# Patient Record
Sex: FEMALE | Race: WHITE | NOT HISPANIC OR LATINO
[De-identification: names, ages, dates, MRNs, and addresses within clinical notes are randomized per-mention and may not be internally consistent; named-entity substitution may affect disease eponyms.]

---

## 2019-02-05 ENCOUNTER — RX RENEWAL (OUTPATIENT)
Age: 54
End: 2019-02-05

## 2019-03-08 ENCOUNTER — RX RENEWAL (OUTPATIENT)
Age: 54
End: 2019-03-08

## 2019-03-25 ENCOUNTER — RECORD ABSTRACTING (OUTPATIENT)
Age: 54
End: 2019-03-25

## 2019-03-25 DIAGNOSIS — I67.89 OTHER CEREBROVASCULAR DISEASE: ICD-10-CM

## 2019-03-25 DIAGNOSIS — F32.9 MAJOR DEPRESSIVE DISORDER, SINGLE EPISODE, UNSPECIFIED: ICD-10-CM

## 2019-03-25 DIAGNOSIS — Z83.3 FAMILY HISTORY OF DIABETES MELLITUS: ICD-10-CM

## 2019-03-25 DIAGNOSIS — Z87.09 PERSONAL HISTORY OF OTHER DISEASES OF THE RESPIRATORY SYSTEM: ICD-10-CM

## 2019-03-25 DIAGNOSIS — N95.2 POSTMENOPAUSAL ATROPHIC VAGINITIS: ICD-10-CM

## 2019-03-25 DIAGNOSIS — Z87.891 PERSONAL HISTORY OF NICOTINE DEPENDENCE: ICD-10-CM

## 2019-03-25 DIAGNOSIS — G56.03 CARPAL TUNNEL SYNDROM,BILATERAL UPPER LIMBS: ICD-10-CM

## 2019-03-25 LAB — CYTOLOGY CVX/VAG DOC THIN PREP: NORMAL

## 2019-03-27 ENCOUNTER — APPOINTMENT (OUTPATIENT)
Dept: ENDOCRINOLOGY | Facility: CLINIC | Age: 54
End: 2019-03-27
Payer: COMMERCIAL

## 2019-03-27 VITALS
HEIGHT: 63.25 IN | HEART RATE: 88 BPM | SYSTOLIC BLOOD PRESSURE: 120 MMHG | BODY MASS INDEX: 30.62 KG/M2 | WEIGHT: 175 LBS | DIASTOLIC BLOOD PRESSURE: 80 MMHG

## 2019-03-27 PROCEDURE — 36415 COLL VENOUS BLD VENIPUNCTURE: CPT

## 2019-03-27 PROCEDURE — XXXXX: CPT

## 2019-03-27 PROCEDURE — 99214 OFFICE O/P EST MOD 30 MIN: CPT | Mod: 25

## 2019-03-27 RX ORDER — FLUOXETINE HYDROCHLORIDE 10 MG/1
10 CAPSULE ORAL
Refills: 0 | Status: DISCONTINUED | COMMUNITY
End: 2019-03-27

## 2019-03-27 RX ORDER — LEVOTHYROXINE SODIUM 75 UG/1
75 CAPSULE ORAL
Refills: 0 | Status: DISCONTINUED | COMMUNITY
Start: 1900-01-01 | End: 2019-03-27

## 2019-03-27 RX ORDER — METHYLPHENIDATE HYDROCHLORIDE 36 MG/1
36 TABLET, EXTENDED RELEASE ORAL
Refills: 0 | Status: DISCONTINUED | COMMUNITY
End: 2019-03-27

## 2019-03-27 RX ORDER — GABAPENTIN 300 MG/1
300 CAPSULE ORAL
Refills: 0 | Status: DISCONTINUED | COMMUNITY
End: 2019-03-27

## 2019-03-27 RX ORDER — LEVOTHYROXINE SODIUM 88 UG/1
88 CAPSULE ORAL DAILY
Qty: 90 | Refills: 0 | Status: DISCONTINUED | COMMUNITY
Start: 2019-02-05 | End: 2019-03-27

## 2019-03-27 RX ORDER — VENLAFAXINE HYDROCHLORIDE 75 MG/1
75 CAPSULE, EXTENDED RELEASE ORAL
Refills: 0 | Status: DISCONTINUED | COMMUNITY
End: 2019-03-27

## 2019-03-27 RX ORDER — DICLOFENAC SODIUM 75 MG/1
TABLET, DELAYED RELEASE ORAL
Refills: 0 | Status: DISCONTINUED | COMMUNITY
End: 2019-03-27

## 2019-03-27 RX ORDER — BETAMETHASONE VALERATE 1 MG/ML
0.1 LOTION CUTANEOUS
Refills: 0 | Status: DISCONTINUED | COMMUNITY
End: 2019-03-27

## 2019-03-27 RX ORDER — ESTRADIOL 0.1 MG/G
0.1 CREAM VAGINAL
Refills: 0 | Status: DISCONTINUED | COMMUNITY
End: 2019-03-27

## 2019-03-27 RX ORDER — ALPRAZOLAM 1 MG/1
1 TABLET ORAL
Refills: 0 | Status: DISCONTINUED | COMMUNITY
End: 2019-03-27

## 2019-04-02 LAB
T3 SERPL-MCNC: 68 NG/DL
T4 FREE SERPL-MCNC: 0.8 NG/DL
TSH SERPL-ACNC: 25.6 UIU/ML

## 2019-04-03 ENCOUNTER — OTHER (OUTPATIENT)
Age: 54
End: 2019-04-03

## 2019-04-18 ENCOUNTER — LABORATORY RESULT (OUTPATIENT)
Age: 54
End: 2019-04-18

## 2019-04-19 ENCOUNTER — NON-APPOINTMENT (OUTPATIENT)
Age: 54
End: 2019-04-19

## 2019-04-19 ENCOUNTER — APPOINTMENT (OUTPATIENT)
Dept: INTERNAL MEDICINE | Facility: CLINIC | Age: 54
End: 2019-04-19
Payer: MEDICAID

## 2019-04-19 VITALS
SYSTOLIC BLOOD PRESSURE: 116 MMHG | BODY MASS INDEX: 30.97 KG/M2 | HEIGHT: 63.25 IN | WEIGHT: 177 LBS | DIASTOLIC BLOOD PRESSURE: 70 MMHG

## 2019-04-19 PROCEDURE — 93000 ELECTROCARDIOGRAM COMPLETE: CPT

## 2019-04-19 PROCEDURE — 99396 PREV VISIT EST AGE 40-64: CPT | Mod: 25

## 2019-04-19 PROCEDURE — 36415 COLL VENOUS BLD VENIPUNCTURE: CPT

## 2019-04-19 RX ORDER — LEVOTHYROXINE SODIUM 0.09 MG/1
88 TABLET ORAL DAILY
Qty: 30 | Refills: 1 | Status: DISCONTINUED | COMMUNITY
Start: 2019-03-08 | End: 2019-04-19

## 2019-04-19 NOTE — COUNSELING
[Weight management counseling provided] : Weight management [Activity counseling provided] : activity [Healthy eating counseling provided] : healthy eating [Good understanding] : Patient has a good understanding of disease, goals and obesity follow-up plan

## 2019-04-19 NOTE — PLAN
[FreeTextEntry1] : 53-year-old female presents for annual exam feels well up-to-date with screenings\par \par Reviewed diet encouraged to get some exercise.\par \par Call one week for lab results

## 2019-04-19 NOTE — HEALTH RISK ASSESSMENT
[Good] : ~his/her~  mood as  good [No falls in past year] : Patient reported no falls in the past year [0] : 2) Feeling down, depressed, or hopeless: Not at all (0) [Patient reported mammogram was normal] : Patient reported mammogram was normal [Patient reported PAP Smear was normal] : Patient reported PAP Smear was normal [Patient reported colonoscopy was normal] : Patient reported colonoscopy was normal [HIV test declined] : HIV test declined [Hepatitis C test declined] : Hepatitis C test declined [None] : None [With Family] : lives with family [] :  [Feels Safe at Home] : Feels safe at home [] : No [MammogramDate] : 08/2017 [MammogramComments] : DR. HAKEEM PASCUAL [PapSmearDate] : 04/2018 [BoneDensityDate] : N/A [PapSmearComments] : DR. HAKEEM PASCUAL [ColonoscopyComments] : DR. CARNES @ [facility] REPEAT IN 5 YEARS  [ColonoscopyDate] : 10/2016

## 2019-04-19 NOTE — HISTORY OF PRESENT ILLNESS
[de-identified] : 53-year-old female presents for annual exam, feels well. Denies chest pain shortness of breath palpitations\par \par Up-to-date with screenings [FreeTextEntry1] : annual exam

## 2019-04-19 NOTE — PHYSICAL EXAM
[Obese] : patient was observed to be obese [Normal Female:] : bladder was normal on palpation [Normal] : no joint swelling, grossly normal strength/tone [de-identified] : Denies pain and lumps discharge [FreeTextEntry1] : Deferred [de-identified] : No lymphadenopathy [de-identified] : Denies excessive thirst, urination, fatigue [de-identified] : No rash, skin lesions [de-identified] : Alert and oriented x3, no thought disorder

## 2019-04-20 LAB
ALBUMIN SERPL ELPH-MCNC: 4.4 G/DL
ALP BLD-CCNC: 76 U/L
ALT SERPL-CCNC: 23 U/L
ANION GAP SERPL CALC-SCNC: 14 MMOL/L
APPEARANCE: ABNORMAL
AST SERPL-CCNC: 33 U/L
BASOPHILS # BLD AUTO: 0.07 K/UL
BASOPHILS NFR BLD AUTO: 0.8 %
BILIRUB SERPL-MCNC: 0.4 MG/DL
BILIRUBIN URINE: NEGATIVE
BLOOD URINE: NEGATIVE
BUN SERPL-MCNC: 20 MG/DL
CALCIUM SERPL-MCNC: 9.6 MG/DL
CHLORIDE SERPL-SCNC: 101 MMOL/L
CHOLEST SERPL-MCNC: 229 MG/DL
CHOLEST/HDLC SERPL: 3.5 RATIO
CO2 SERPL-SCNC: 24 MMOL/L
COLOR: YELLOW
CREAT SERPL-MCNC: 0.71 MG/DL
EOSINOPHIL # BLD AUTO: 0.3 K/UL
EOSINOPHIL NFR BLD AUTO: 3.4 %
FOLATE SERPL-MCNC: 11.1 NG/ML
GLUCOSE QUALITATIVE U: NEGATIVE
GLUCOSE SERPL-MCNC: 102 MG/DL
HCT VFR BLD CALC: 40.6 %
HDLC SERPL-MCNC: 65 MG/DL
HGB BLD-MCNC: 12.9 G/DL
IMM GRANULOCYTES NFR BLD AUTO: 0.3 %
KETONES URINE: NEGATIVE
LDLC SERPL CALC-MCNC: 137 MG/DL
LEUKOCYTE ESTERASE URINE: ABNORMAL
LYMPHOCYTES # BLD AUTO: 2.18 K/UL
LYMPHOCYTES NFR BLD AUTO: 24.6 %
MAN DIFF?: NORMAL
MCHC RBC-ENTMCNC: 30.1 PG
MCHC RBC-ENTMCNC: 31.8 GM/DL
MCV RBC AUTO: 94.9 FL
MONOCYTES # BLD AUTO: 0.81 K/UL
MONOCYTES NFR BLD AUTO: 9.1 %
NEUTROPHILS # BLD AUTO: 5.47 K/UL
NEUTROPHILS NFR BLD AUTO: 61.8 %
NITRITE URINE: NEGATIVE
PH URINE: 5.5
PLATELET # BLD AUTO: 293 K/UL
POTASSIUM SERPL-SCNC: 4.3 MMOL/L
PROT SERPL-MCNC: 7.5 G/DL
PROTEIN URINE: NORMAL
RBC # BLD: 4.28 M/UL
RBC # FLD: 14.9 %
SODIUM SERPL-SCNC: 139 MMOL/L
SPECIFIC GRAVITY URINE: 1.02
T4 FREE SERPL-MCNC: 1.3 NG/DL
TRIGL SERPL-MCNC: 137 MG/DL
TSH SERPL-ACNC: 9.9 UIU/ML
UROBILINOGEN URINE: NORMAL
VIT B12 SERPL-MCNC: 472 PG/ML
WBC # FLD AUTO: 8.86 K/UL

## 2019-04-21 LAB — 25(OH)D3 SERPL-MCNC: 22.8 NG/ML

## 2019-06-03 ENCOUNTER — RESULT REVIEW (OUTPATIENT)
Age: 54
End: 2019-06-03

## 2019-07-20 NOTE — HISTORY OF PRESENT ILLNESS
[FreeTextEntry1] : Ms. JAG TALBERT is a 53 year old female\par coming for f/u hypothyroidism\par        used to be on Levothyroxine 75mcg same dose for about a month dose increased from 88mcg RYAN Synthroid by her PCP by her PCP Dr Preet Gutierrez [place]\par        11/7/2016 was swiched from Synthroid to [place] alternates from 30mg qod to 45mg qod\par        feels better, denies palpitations, forgets to take the 15mg on and off about twice a week per pt \par        negative cardiac workup per pt\par        liked it but she can not afford two copays, asking to try 60mg\par        will repeat labs first and decide depending on her results\par        seen Dr Potts before who diagnosed her with hypothyroidism, felt really tired, pains all over\par        last labwork 8/12/16 by Dr Chen her asthma doctor has an echocardiogram scheduled , sees Cardiology Dr Prasad on [address]\par        AST mildly elevated 46\par        TSH 0.33\par        thyroid US 9/2015 showed 7mm isthmus nodule\par        denies FHx thyroid Ca\par        denies neck iraadiation\par        has problems swallowing solid food, on and off\par        has dysphonia on and off\par        has asthma, has dyspnea\par        repeated thyroid US 9/2016 stable 7mm isthmus nodule.

## 2019-07-20 NOTE — REVIEW OF SYSTEMS
[Fatigue] : fatigue [SOB on Exertion] : shortness of breath during exertion [Dry Skin] : dry skin [Depression] : depression [Anxiety] : anxiety [Insomnia] : insomnia [FreeTextEntry8] : postmenopausal since 50yo [de-identified] : rash dryness , itching, will see [facility]

## 2019-08-14 ENCOUNTER — LABORATORY RESULT (OUTPATIENT)
Age: 54
End: 2019-08-14

## 2019-08-14 ENCOUNTER — APPOINTMENT (OUTPATIENT)
Dept: ENDOCRINOLOGY | Facility: CLINIC | Age: 54
End: 2019-08-14

## 2019-08-27 ENCOUNTER — APPOINTMENT (OUTPATIENT)
Dept: OBGYN | Facility: CLINIC | Age: 54
End: 2019-08-27
Payer: MEDICAID

## 2019-08-27 VITALS
BODY MASS INDEX: 31.01 KG/M2 | HEIGHT: 63 IN | SYSTOLIC BLOOD PRESSURE: 112 MMHG | WEIGHT: 175 LBS | DIASTOLIC BLOOD PRESSURE: 70 MMHG

## 2019-08-27 PROCEDURE — 99396 PREV VISIT EST AGE 40-64: CPT

## 2019-10-02 ENCOUNTER — APPOINTMENT (OUTPATIENT)
Dept: INTERNAL MEDICINE | Facility: CLINIC | Age: 54
End: 2019-10-02
Payer: MEDICAID

## 2019-10-02 VITALS
BODY MASS INDEX: 31.01 KG/M2 | WEIGHT: 175 LBS | HEIGHT: 63 IN | DIASTOLIC BLOOD PRESSURE: 80 MMHG | SYSTOLIC BLOOD PRESSURE: 122 MMHG

## 2019-10-02 DIAGNOSIS — M54.9 DORSALGIA, UNSPECIFIED: ICD-10-CM

## 2019-10-02 PROCEDURE — 90686 IIV4 VACC NO PRSV 0.5 ML IM: CPT

## 2019-10-02 PROCEDURE — 99213 OFFICE O/P EST LOW 20 MIN: CPT | Mod: 25

## 2019-10-02 PROCEDURE — G0008: CPT

## 2019-10-02 NOTE — PHYSICAL EXAM
[No Acute Distress] : no acute distress [Well Nourished] : well nourished [Well Developed] : well developed [Well-Appearing] : well-appearing [Normal Sclera/Conjunctiva] : normal sclera/conjunctiva [PERRL] : pupils equal round and reactive to light [Normal Outer Ear/Nose] : the outer ears and nose were normal in appearance [EOMI] : extraocular movements intact [Normal Oropharynx] : the oropharynx was normal [No Lymphadenopathy] : no lymphadenopathy [No JVD] : no jugular venous distention [Supple] : supple [Thyroid Normal, No Nodules] : the thyroid was normal and there were no nodules present [No Respiratory Distress] : no respiratory distress  [No Accessory Muscle Use] : no accessory muscle use [Normal Rate] : normal rate  [Clear to Auscultation] : lungs were clear to auscultation bilaterally [Regular Rhythm] : with a regular rhythm [Normal S1, S2] : normal S1 and S2 [No Carotid Bruits] : no carotid bruits [No Murmur] : no murmur heard [No Abdominal Bruit] : a ~M bruit was not heard ~T in the abdomen [No Varicosities] : no varicosities [Pedal Pulses Present] : the pedal pulses are present [No Edema] : there was no peripheral edema [No Palpable Aorta] : no palpable aorta [No Extremity Clubbing/Cyanosis] : no extremity clubbing/cyanosis [Soft] : abdomen soft [Non Tender] : non-tender [Non-distended] : non-distended [No Masses] : no abdominal mass palpated [No HSM] : no HSM [Normal Bowel Sounds] : normal bowel sounds [Normal Posterior Cervical Nodes] : no posterior cervical lymphadenopathy [Normal Anterior Cervical Nodes] : no anterior cervical lymphadenopathy [No CVA Tenderness] : no CVA  tenderness [No Spinal Tenderness] : no spinal tenderness [No Joint Swelling] : no joint swelling [Grossly Normal Strength/Tone] : grossly normal strength/tone [Coordination Grossly Intact] : coordination grossly intact [No Rash] : no rash [No Focal Deficits] : no focal deficits [Normal Gait] : normal gait [Deep Tendon Reflexes (DTR)] : deep tendon reflexes were 2+ and symmetric [Normal Affect] : the affect was normal [Normal Insight/Judgement] : insight and judgment were intact

## 2019-10-02 NOTE — HISTORY OF PRESENT ILLNESS
[FreeTextEntry8] : 53-year-old female presents with complaint of lower back pain, even though she doesn't have any urinary frequency urgency or dysuria she is afraid she may have a urinary tract infection. She has an appointment with an orthopedist at the end of this month to address her back pain. Denies weakness, fever chills

## 2019-10-02 NOTE — PLAN
[FreeTextEntry1] : 53-year-old female complaining of back pain concerned she may have urinary tract infection. Denies urinary frequency urgency dysuria. She has an appointment with orthopedist at the end of this month for her back pain. Reassured patient it was likely not a urinary tract infection, keep appointment with orthopedist but urinalysis and urine culture will be sent today\par \par Call 2 days to review results

## 2019-10-03 LAB
APPEARANCE: CLEAR
BILIRUBIN URINE: NEGATIVE
BLOOD URINE: NEGATIVE
COLOR: NORMAL
GLUCOSE QUALITATIVE U: NEGATIVE
KETONES URINE: NEGATIVE
LEUKOCYTE ESTERASE URINE: NEGATIVE
NITRITE URINE: NEGATIVE
PH URINE: 5
PROTEIN URINE: NEGATIVE
SPECIFIC GRAVITY URINE: 1.01
UROBILINOGEN URINE: NORMAL

## 2019-10-04 LAB — BACTERIA UR CULT: NORMAL

## 2019-10-14 ENCOUNTER — RESULT REVIEW (OUTPATIENT)
Age: 54
End: 2019-10-14

## 2019-10-23 ENCOUNTER — RECORD ABSTRACTING (OUTPATIENT)
Age: 54
End: 2019-10-23

## 2019-10-23 ENCOUNTER — APPOINTMENT (OUTPATIENT)
Dept: ENDOCRINOLOGY | Facility: CLINIC | Age: 54
End: 2019-10-23

## 2019-10-24 LAB
CYTOLOGY CVX/VAG DOC THIN PREP: NORMAL
HPV HIGH+LOW RISK DNA PNL CVX: NOT DETECTED

## 2019-11-29 LAB
T4 FREE SERPL-MCNC: 1.4 NG/DL
TSH SERPL-ACNC: 1.8 UIU/ML

## 2020-01-27 ENCOUNTER — APPOINTMENT (OUTPATIENT)
Dept: INTERNAL MEDICINE | Facility: CLINIC | Age: 55
End: 2020-01-27
Payer: MEDICAID

## 2020-01-27 VITALS
SYSTOLIC BLOOD PRESSURE: 100 MMHG | HEIGHT: 63 IN | WEIGHT: 175 LBS | BODY MASS INDEX: 31.01 KG/M2 | DIASTOLIC BLOOD PRESSURE: 60 MMHG

## 2020-01-27 VITALS — DIASTOLIC BLOOD PRESSURE: 64 MMHG | SYSTOLIC BLOOD PRESSURE: 108 MMHG

## 2020-01-27 PROCEDURE — 99213 OFFICE O/P EST LOW 20 MIN: CPT

## 2020-01-27 RX ORDER — TRAZODONE HYDROCHLORIDE 50 MG/1
50 TABLET ORAL DAILY
Refills: 0 | Status: DISCONTINUED | COMMUNITY
Start: 2019-03-18 | End: 2020-01-27

## 2020-01-27 RX ORDER — QUETIAPINE FUMARATE 50 MG/1
50 TABLET ORAL
Refills: 0 | Status: DISCONTINUED | COMMUNITY
End: 2020-01-27

## 2020-01-27 NOTE — REVIEW OF SYSTEMS
[Earache] : no earache [Hearing Loss] : no hearing loss [Negative] : Heme/Lymph [FreeTextEntry4] : itchy ears

## 2020-01-27 NOTE — PLAN
[FreeTextEntry1] : 54-year-old female presents for blood pressure check following reading of 100/60 in neurologists office. Blood pressure is about the same here, patient is asymptomatic. Reviewed diet and fluid intake. Advised patient if she develops any symptoms of low blood pressure i.e. dizziness lightheadedness to return to office\par \par Slight eczema both the ears given Elocon, advised to use h.s. for a few nights in a row\par \par If any symptoms increase or persist return to office

## 2020-01-27 NOTE — HISTORY OF PRESENT ILLNESS
[Spouse] : spouse [FreeTextEntry1] : Complaining of low blood pressure and itchy ears [de-identified] : 54 year-old female presents following appointment with neurologist where her blood pressure was on the low side, was advised to follow up with PCP. Patient feels well, denies chest pain shortness of breath palpitations dizziness. Also complaining about very itchy years, denies earache

## 2020-10-01 PROBLEM — I67.89 CEREBRAL MICROVASCULAR DISEASE: Status: ACTIVE | Noted: 2019-03-25

## 2020-10-02 ENCOUNTER — APPOINTMENT (OUTPATIENT)
Dept: INTERNAL MEDICINE | Facility: CLINIC | Age: 55
End: 2020-10-02
Payer: MEDICAID

## 2020-10-02 ENCOUNTER — NON-APPOINTMENT (OUTPATIENT)
Age: 55
End: 2020-10-02

## 2020-10-02 VITALS
SYSTOLIC BLOOD PRESSURE: 122 MMHG | TEMPERATURE: 97.1 F | HEIGHT: 63 IN | BODY MASS INDEX: 30.65 KG/M2 | DIASTOLIC BLOOD PRESSURE: 70 MMHG | WEIGHT: 173 LBS

## 2020-10-02 PROCEDURE — 90686 IIV4 VACC NO PRSV 0.5 ML IM: CPT

## 2020-10-02 PROCEDURE — 36415 COLL VENOUS BLD VENIPUNCTURE: CPT

## 2020-10-02 PROCEDURE — 99396 PREV VISIT EST AGE 40-64: CPT | Mod: 25

## 2020-10-02 PROCEDURE — 93000 ELECTROCARDIOGRAM COMPLETE: CPT

## 2020-10-02 PROCEDURE — G0008: CPT

## 2020-10-02 NOTE — HISTORY OF PRESENT ILLNESS
[FreeTextEntry1] : Annual exam [de-identified] : 54-year-old female former smoker presents for annual exam complaining of persistent cough. Patient has history of greater than 30-pack-year smoker, quit approximately 10-15 years ago. Unsure if she smoked at all in the past 10-15 years. Denies chest pain shortness of breath palpitations dizziness. Patient also has clear nasal discharge which is persistent, she has several allergies and she has cats at home.\par \par Up to date with screenings

## 2020-10-02 NOTE — REVIEW OF SYSTEMS
[Nasal Discharge] : nasal discharge [Postnasal Drip] : postnasal drip [Cough] : cough [Negative] : Heme/Lymph [FreeTextEntry6] : persistent

## 2020-10-02 NOTE — HEALTH RISK ASSESSMENT
[Very Good] : ~his/her~  mood as very good [No] : No [Never (0 pts)] : Never (0 points) [No falls in past year] : Patient reported no falls in the past year [0] : 2) Feeling down, depressed, or hopeless: Not at all (0) [] : No [[id number]] : 0

## 2020-10-02 NOTE — PHYSICAL EXAM
[Normal Female:] : bladder was normal on palpation [Normal] : normal gait, coordination grossly intact, no focal deficits [de-identified] : Deferred GYN; Denies pain, lumps and discharge [FreeTextEntry1] : Deferred GI/GYN [de-identified] : No lymphadenopathy [de-identified] : Denies excessive thirst, urination, fatigue [de-identified] : No rash or skin lesion [de-identified] : Alert and Oriented x3.  Appropriate mood and affect

## 2020-10-02 NOTE — PLAN
[FreeTextEntry1] : 54-year-old female former smoker presents for annual exam complaining of persistent cough and clear nasal discharge. Most likely related to allergies but due to history of smoking we'll get a chest CT. Advised to start Allegra h.s. and use her Flonase HS consistently. Also advised to keep her cats out of her bedroom.\par Flu vaccine given today\par Reviewed diet and stressed importance of regular exercise.\par \par Up to date with colonoscopy, due for mammogram which she will make an appointment for\par \par Call next week to review labs

## 2020-10-03 LAB
ALBUMIN SERPL ELPH-MCNC: 4.3 G/DL
ALP BLD-CCNC: 79 U/L
ALT SERPL-CCNC: 14 U/L
ANION GAP SERPL CALC-SCNC: 13 MMOL/L
APPEARANCE: ABNORMAL
AST SERPL-CCNC: 23 U/L
BASOPHILS # BLD AUTO: 0.09 K/UL
BASOPHILS NFR BLD AUTO: 1.1 %
BILIRUB SERPL-MCNC: 0.3 MG/DL
BILIRUBIN URINE: NEGATIVE
BLOOD URINE: NEGATIVE
BUN SERPL-MCNC: 11 MG/DL
CALCIUM SERPL-MCNC: 9.4 MG/DL
CHLORIDE SERPL-SCNC: 97 MMOL/L
CHOLEST SERPL-MCNC: 227 MG/DL
CHOLEST/HDLC SERPL: 4.7 RATIO
CO2 SERPL-SCNC: 25 MMOL/L
COLOR: NORMAL
CREAT SERPL-MCNC: 0.67 MG/DL
EOSINOPHIL # BLD AUTO: 0.42 K/UL
EOSINOPHIL NFR BLD AUTO: 4.9 %
FOLATE SERPL-MCNC: 5.1 NG/ML
GLUCOSE QUALITATIVE U: NEGATIVE
GLUCOSE SERPL-MCNC: 98 MG/DL
HCT VFR BLD CALC: 40 %
HDLC SERPL-MCNC: 49 MG/DL
HGB BLD-MCNC: 12.3 G/DL
IMM GRANULOCYTES NFR BLD AUTO: 0.5 %
KETONES URINE: NEGATIVE
LDLC SERPL CALC-MCNC: 149 MG/DL
LEUKOCYTE ESTERASE URINE: ABNORMAL
LYMPHOCYTES # BLD AUTO: 2.45 K/UL
LYMPHOCYTES NFR BLD AUTO: 28.6 %
MAN DIFF?: NORMAL
MCHC RBC-ENTMCNC: 29 PG
MCHC RBC-ENTMCNC: 30.8 GM/DL
MCV RBC AUTO: 94.3 FL
MONOCYTES # BLD AUTO: 0.71 K/UL
MONOCYTES NFR BLD AUTO: 8.3 %
NEUTROPHILS # BLD AUTO: 4.86 K/UL
NEUTROPHILS NFR BLD AUTO: 56.6 %
NITRITE URINE: NEGATIVE
PH URINE: 5.5
PLATELET # BLD AUTO: 314 K/UL
POTASSIUM SERPL-SCNC: 4.5 MMOL/L
PROT SERPL-MCNC: 7 G/DL
PROTEIN URINE: NEGATIVE
RBC # BLD: 4.24 M/UL
RBC # FLD: 14.3 %
SARS-COV-2 IGG SERPL IA-ACNC: 0.09 INDEX
SARS-COV-2 IGG SERPL QL IA: NEGATIVE
SODIUM SERPL-SCNC: 136 MMOL/L
SPECIFIC GRAVITY URINE: 1
T4 FREE SERPL-MCNC: 1.3 NG/DL
TRIGL SERPL-MCNC: 149 MG/DL
TSH SERPL-ACNC: 4.31 UIU/ML
UROBILINOGEN URINE: NORMAL
VIT B12 SERPL-MCNC: 468 PG/ML
WBC # FLD AUTO: 8.57 K/UL

## 2020-10-06 ENCOUNTER — RX RENEWAL (OUTPATIENT)
Age: 55
End: 2020-10-06

## 2020-10-09 ENCOUNTER — TRANSCRIPTION ENCOUNTER (OUTPATIENT)
Age: 55
End: 2020-10-09

## 2020-11-18 ENCOUNTER — RESULT REVIEW (OUTPATIENT)
Age: 55
End: 2020-11-18

## 2020-11-20 ENCOUNTER — RESULT REVIEW (OUTPATIENT)
Age: 55
End: 2020-11-20

## 2020-11-20 ENCOUNTER — APPOINTMENT (OUTPATIENT)
Dept: INTERNAL MEDICINE | Facility: CLINIC | Age: 55
End: 2020-11-20
Payer: MEDICAID

## 2020-11-20 VITALS
SYSTOLIC BLOOD PRESSURE: 110 MMHG | BODY MASS INDEX: 31.01 KG/M2 | WEIGHT: 175 LBS | HEIGHT: 63 IN | TEMPERATURE: 98.9 F | DIASTOLIC BLOOD PRESSURE: 60 MMHG

## 2020-11-20 PROCEDURE — 99213 OFFICE O/P EST LOW 20 MIN: CPT

## 2020-11-20 NOTE — PLAN
[FreeTextEntry1] : 55-year-old female with persistent cough for a few months, mostly nonproductive. Denies chest pain shortness of breath palpitations dizziness. Denies wheezing. Former smoker. As noted chest x-ray will be done today, we will continue to pursue low dose chest CT but also refer to Dr. Lang

## 2020-11-20 NOTE — HISTORY OF PRESENT ILLNESS
[FreeTextEntry8] : 55-year-old female presents complaining of persistent mostly dry cough. She is a former smoker, we've tried for a couple of months to get approval for low dose chest CT which we've been unable to obtain from her insurance company which has resolved frustrated. Her lungs are clear to auscultation, she denies chest pain shortness of breath. Today a chest x-ray will be done and if normal will refer to Dr. Rolan Lang for persistent cough

## 2020-12-16 ENCOUNTER — APPOINTMENT (OUTPATIENT)
Dept: INTERNAL MEDICINE | Facility: CLINIC | Age: 55
End: 2020-12-16
Payer: MEDICAID

## 2020-12-16 PROCEDURE — 36415 COLL VENOUS BLD VENIPUNCTURE: CPT

## 2020-12-16 PROCEDURE — 99072 ADDL SUPL MATRL&STAF TM PHE: CPT

## 2020-12-17 LAB
CHOLEST SERPL-MCNC: 141 MG/DL
HDLC SERPL-MCNC: 52 MG/DL
LDLC SERPL CALC-MCNC: 71 MG/DL
NONHDLC SERPL-MCNC: 89 MG/DL
TRIGL SERPL-MCNC: 90 MG/DL

## 2020-12-18 ENCOUNTER — APPOINTMENT (OUTPATIENT)
Dept: ENDOCRINOLOGY | Facility: CLINIC | Age: 55
End: 2020-12-18
Payer: MEDICAID

## 2020-12-18 VITALS
OXYGEN SATURATION: 98 % | SYSTOLIC BLOOD PRESSURE: 108 MMHG | HEIGHT: 63 IN | DIASTOLIC BLOOD PRESSURE: 64 MMHG | BODY MASS INDEX: 30.12 KG/M2 | TEMPERATURE: 96.9 F | HEART RATE: 84 BPM | WEIGHT: 170 LBS

## 2020-12-18 PROCEDURE — 36415 COLL VENOUS BLD VENIPUNCTURE: CPT

## 2020-12-18 PROCEDURE — 99072 ADDL SUPL MATRL&STAF TM PHE: CPT

## 2020-12-18 PROCEDURE — 99214 OFFICE O/P EST MOD 30 MIN: CPT | Mod: 25

## 2020-12-18 NOTE — REVIEW OF SYSTEMS
[Nasal Congestion] : nasal congestion [Joint Pain] : joint pain [Negative] : Heme/Lymph [FreeTextEntry4] : sinus problems sees ENT  [FreeTextEntry8] : pain in her breast has mammogram scheduled next week, also has appointment with GYN advised to get one rather sooner than later as has FHx breast cancer [FreeTextEntry9] : left shoulder pain chronically per pt  [de-identified] : itchy scalp chronically seen derm did not help per pt

## 2020-12-18 NOTE — HISTORY OF PRESENT ILLNESS
[FreeTextEntry1] : Ms. JAG TALBERT is a 55 year old female\par coming for f/u hypothyroidism\par on Tirosint 75mcg qd \par        used to be on Levothyroxine 75mcg same dose for about a month dose increased from 88mcg RYAN Synthroid by her PCP by her PCP Dr Preet Gutierrez [place]\par        11/7/2016 was swiched from Synthroid to [place] alternates from 30mg qod to 45mg qod\par        feels better, denies palpitations, forgets to take the 15mg on and off about twice a week per pt \par        negative cardiac workup per pt\par        liked it but she can not afford two copays, asking to try 60mg\par        will repeat labs first and decide depending on her results\par        seen Dr Potts before who diagnosed her with hypothyroidism, felt really tired, pains all over\par        last labwork 8/12/16 by Dr Chen her asthma doctor has an echocardiogram scheduled , sees Cardiology Dr Prasad on [address]\par        AST mildly elevated 46\par        TSH 0.33\par        thyroid US 9/2015 showed 7mm isthmus nodule\par        denies FHx thyroid Ca\par        denies neck iraadiation\par        has problems swallowing solid food, on and off\par        has dysphonia on and off\par        has asthma, has dyspnea\par        repeated thyroid US 9/2016 stable 7mm isthmus nodule.

## 2020-12-20 LAB
T4 FREE SERPL-MCNC: 1.5 NG/DL
TSH SERPL-ACNC: 1.25 UIU/ML

## 2020-12-23 ENCOUNTER — RESULT REVIEW (OUTPATIENT)
Age: 55
End: 2020-12-23

## 2021-01-04 ENCOUNTER — APPOINTMENT (OUTPATIENT)
Dept: THORACIC SURGERY | Facility: CLINIC | Age: 56
End: 2021-01-04
Payer: MEDICAID

## 2021-01-04 VITALS — HEIGHT: 64 IN | WEIGHT: 170 LBS | BODY MASS INDEX: 29.02 KG/M2

## 2021-01-04 DIAGNOSIS — Z80.1 FAMILY HISTORY OF MALIGNANT NEOPLASM OF TRACHEA, BRONCHUS AND LUNG: ICD-10-CM

## 2021-01-04 DIAGNOSIS — Z87.891 PERSONAL HISTORY OF NICOTINE DEPENDENCE: ICD-10-CM

## 2021-01-04 PROCEDURE — G0296 VISIT TO DETERM LDCT ELIG: CPT

## 2021-01-04 PROCEDURE — 99072 ADDL SUPL MATRL&STAF TM PHE: CPT

## 2021-01-04 NOTE — REASON FOR VISIT
[Home] : at home, [unfilled] , at the time of the visit. [Medical Office: ([facility])___] : at the medical office located in  [Verbal consent obtained from patient] : the patient, [unfilled] [Initial Evaluation] : an initial evaluation visit [Review of Eligibility] : review of eligibility [Low-Dose CT Screening Discussion] : low-dose CT lung cancer screening discussion

## 2021-01-04 NOTE — PLAN
[FreeTextEntry1] : Plan:\par \par -Low dose CT chest for lung cancer screening. Lori Vasquez FNP has been CC'd in order for LDCT.\par \par -Follow up with patient and her referring provider after her LDCT results have been reviewed by the multidisciplinary clinical team.\par \par -Encourage continued smoking abstinence.\par \par Engaged in discussion regarding risks of screening during Covid-19 pandemic and precautions that are being used  to reduce exposure.\par \par Engaged in shared decision making with Ms. TALBERT . Answered all questions. She verbalized understanding and agreement. She knows to call back with and questions or concerns.\par

## 2021-01-04 NOTE — HISTORY OF PRESENT ILLNESS
[Former] : former smoker [_____ pack-years] : [unfilled] pack-years [TextBox_13] : Referred by Lori Vasquez\par \par JAG TALBERT had telephonic visit for a review of eligibility and discussion of the Low dose CT lung cancer screening program. A telephonic visit occurred due to the patient not having access to a smart phone or a computer for an audio/visual visit.  The following was reviewed and confirmed the patient meets screening eligibility criteria.\par -Age 55 year\par Smoking Status:\par -Former smoker\par -Number of pack(s) per day: 2ppd\par -Number of years smoked: 30\par -Number of pack years smokin\par -Number of years since quitting smoking: 15\par \par Ms. TALBERT reports having exertional dyspnea and chronic cough for many years. Become short of breath walking up stairs. She reports she was once told she had asthma. But is not currently being treated . She denies any signs or symptoms of lung cancer including new cough, changing cough, hemoptysis, and unintentional weight loss. \par \par Ms. TALBERT  denies any personal history of lung cancer. Reports no lung cancer in a 1st degree relative. Denies and history of lung disease. Denies any history of  occupational exposures.\par

## 2021-01-13 ENCOUNTER — RESULT REVIEW (OUTPATIENT)
Age: 56
End: 2021-01-13

## 2021-01-15 ENCOUNTER — NON-APPOINTMENT (OUTPATIENT)
Age: 56
End: 2021-01-15

## 2021-01-19 ENCOUNTER — RX CHANGE (OUTPATIENT)
Age: 56
End: 2021-01-19

## 2021-03-01 ENCOUNTER — APPOINTMENT (OUTPATIENT)
Dept: PULMONOLOGY | Facility: CLINIC | Age: 56
End: 2021-03-01
Payer: MEDICAID

## 2021-03-01 VITALS
HEART RATE: 82 BPM | DIASTOLIC BLOOD PRESSURE: 60 MMHG | HEIGHT: 64 IN | OXYGEN SATURATION: 96 % | SYSTOLIC BLOOD PRESSURE: 98 MMHG | WEIGHT: 171 LBS | TEMPERATURE: 96.9 F | BODY MASS INDEX: 29.19 KG/M2

## 2021-03-01 PROCEDURE — 99204 OFFICE O/P NEW MOD 45 MIN: CPT

## 2021-03-01 PROCEDURE — 99072 ADDL SUPL MATRL&STAF TM PHE: CPT

## 2021-03-01 NOTE — REVIEW OF SYSTEMS
[Postnasal Drip] : postnasal drip [Cough] : cough [Dyspnea] : dyspnea [Thyroid Problem] : thyroid problem [Negative] : Psychiatric

## 2021-03-01 NOTE — HISTORY OF PRESENT ILLNESS
[TextBox_4] : 55 year old female with hypothyroidism, ex smoker was referred for an abnormal LDCT done for lung cancer screening\par CT chest seen, my interpretation: several stellate, nodular lesions b/l. Upper lobes fibrosis. No adenopathy or effusions.\par Has dyspnea on exertion for many years.\par She coughs every day especially when inhaling perfumes, dust.\par No sputum production..\par She coughs mostly in the daytime.\par No hemoptysis.\par No weight loss.\par SHX: quit 15 years ago- 60 pack year\par FHX: grandmother with lung cancer

## 2021-03-01 NOTE — PHYSICAL EXAM
[No Acute Distress] : no acute distress [Normal Oropharynx] : normal oropharynx [II] : Mallampati Class: II [Normal Appearance] : normal appearance [No Neck Mass] : no neck mass [Normal Rate/Rhythm] : normal rate/rhythm [Normal S1, S2] : normal s1, s2 [No Murmurs] : no murmurs [No Resp Distress] : no resp distress [No Acc Muscle Use] : no acc muscle use [Clear to Auscultation Bilaterally] : clear to auscultation bilaterally [Benign] : benign [Normal Gait] : normal gait [No Clubbing] : no clubbing [No Cyanosis] : no cyanosis [No Edema] : no edema [Normal Turgor] : normal turgor [No Focal Deficits] : no focal deficits [Oriented x3] : oriented x3 [Normal Affect] : normal affect

## 2021-03-10 ENCOUNTER — APPOINTMENT (OUTPATIENT)
Dept: OBGYN | Facility: CLINIC | Age: 56
End: 2021-03-10
Payer: MEDICAID

## 2021-03-10 VITALS
WEIGHT: 174 LBS | BODY MASS INDEX: 29.71 KG/M2 | HEIGHT: 64 IN | SYSTOLIC BLOOD PRESSURE: 90 MMHG | DIASTOLIC BLOOD PRESSURE: 60 MMHG

## 2021-03-10 PROCEDURE — 99396 PREV VISIT EST AGE 40-64: CPT

## 2021-03-10 PROCEDURE — 99072 ADDL SUPL MATRL&STAF TM PHE: CPT

## 2021-03-11 NOTE — HISTORY OF PRESENT ILLNESS
[TextBox_4] : 56yo P1 here for annual gyn exam. Used to go to [facility]. Follows with Dr. Hernandez.  since age 49yo. No bleeding.  Used to have hot flashes, but they have stopped. Not sexually active x years b/c of 's health..\par         x 20yrs. Not active x 8yrs b/c of pain during sex.  Not working currently; used to be a  at [employer].  works. Has one daughter 19yo/ in [place]; she is attending [employer] studying biology.

## 2021-03-12 ENCOUNTER — APPOINTMENT (OUTPATIENT)
Dept: GASTROENTEROLOGY | Facility: CLINIC | Age: 56
End: 2021-03-12

## 2021-03-23 ENCOUNTER — RESULT REVIEW (OUTPATIENT)
Age: 56
End: 2021-03-23

## 2021-04-12 ENCOUNTER — RX RENEWAL (OUTPATIENT)
Age: 56
End: 2021-04-12

## 2021-04-22 ENCOUNTER — APPOINTMENT (OUTPATIENT)
Dept: PULMONOLOGY | Facility: CLINIC | Age: 56
End: 2021-04-22
Payer: MEDICAID

## 2021-04-22 VITALS
OXYGEN SATURATION: 96 % | HEART RATE: 80 BPM | DIASTOLIC BLOOD PRESSURE: 70 MMHG | HEIGHT: 64 IN | WEIGHT: 174 LBS | BODY MASS INDEX: 29.71 KG/M2 | TEMPERATURE: 97 F | SYSTOLIC BLOOD PRESSURE: 102 MMHG

## 2021-04-22 PROCEDURE — 99072 ADDL SUPL MATRL&STAF TM PHE: CPT

## 2021-04-22 PROCEDURE — 99214 OFFICE O/P EST MOD 30 MIN: CPT

## 2021-04-22 NOTE — HISTORY OF PRESENT ILLNESS
[TextBox_4] : 55 year old female with lung nodules, ex smoker came after PET scan.\par She feels the same.\par has mild dyspnea on exertion, but no cough, no hemoptysis.\par She gained weight in the past year\par PFT's with mild restriction due to obesity. BMI 30.9\par PET scan report: no uptake in the several nodular densities. No adenopathy. Uptake in the thyroid gland\par PET scan seen, my read: same nodular densities as CT chest\par \par SHX: does not smoke anymore

## 2021-04-22 NOTE — PHYSICAL EXAM
[No Acute Distress] : no acute distress [Normal Appearance] : normal appearance [Normal Rate/Rhythm] : normal rate/rhythm [Normal S1, S2] : normal s1, s2 [No Resp Distress] : no resp distress [No Acc Muscle Use] : no acc muscle use [Clear to Auscultation Bilaterally] : clear to auscultation bilaterally [No Abnormalities] : no abnormalities [Normal Gait] : normal gait [No Clubbing] : no clubbing [No Cyanosis] : no cyanosis [No Focal Deficits] : no focal deficits [Oriented x3] : oriented x3 [Normal Affect] : normal affect

## 2021-04-23 ENCOUNTER — NON-APPOINTMENT (OUTPATIENT)
Age: 56
End: 2021-04-23

## 2021-05-07 ENCOUNTER — APPOINTMENT (OUTPATIENT)
Dept: INTERNAL MEDICINE | Facility: CLINIC | Age: 56
End: 2021-05-07
Payer: MEDICAID

## 2021-05-07 VITALS
WEIGHT: 173 LBS | HEIGHT: 64 IN | DIASTOLIC BLOOD PRESSURE: 80 MMHG | SYSTOLIC BLOOD PRESSURE: 120 MMHG | BODY MASS INDEX: 29.53 KG/M2

## 2021-05-07 DIAGNOSIS — J30.1 ALLERGIC RHINITIS DUE TO POLLEN: ICD-10-CM

## 2021-05-07 LAB
ALBUMIN SERPL ELPH-MCNC: 4.5 G/DL
ALP BLD-CCNC: 90 U/L
ALT SERPL-CCNC: 13 U/L
ANION GAP SERPL CALC-SCNC: 13 MMOL/L
AST SERPL-CCNC: 27 U/L
BASOPHILS # BLD AUTO: 0.08 K/UL
BASOPHILS NFR BLD AUTO: 0.9 %
BILIRUB SERPL-MCNC: 0.4 MG/DL
BUN SERPL-MCNC: 12 MG/DL
CALCIUM SERPL-MCNC: 9.5 MG/DL
CHLORIDE SERPL-SCNC: 101 MMOL/L
CO2 SERPL-SCNC: 24 MMOL/L
CREAT SERPL-MCNC: 0.85 MG/DL
EOSINOPHIL # BLD AUTO: 0.39 K/UL
EOSINOPHIL NFR BLD AUTO: 4.5 %
GLUCOSE SERPL-MCNC: 113 MG/DL
HCT VFR BLD CALC: 40.1 %
HGB BLD-MCNC: 12.4 G/DL
IMM GRANULOCYTES NFR BLD AUTO: 0.3 %
LYMPHOCYTES # BLD AUTO: 2.1 K/UL
LYMPHOCYTES NFR BLD AUTO: 24.4 %
MAN DIFF?: NORMAL
MCHC RBC-ENTMCNC: 28 PG
MCHC RBC-ENTMCNC: 30.9 GM/DL
MCV RBC AUTO: 90.5 FL
MONOCYTES # BLD AUTO: 0.73 K/UL
MONOCYTES NFR BLD AUTO: 8.5 %
NEUTROPHILS # BLD AUTO: 5.26 K/UL
NEUTROPHILS NFR BLD AUTO: 61.4 %
PLATELET # BLD AUTO: 338 K/UL
POTASSIUM SERPL-SCNC: 4 MMOL/L
PROT SERPL-MCNC: 7.4 G/DL
RBC # BLD: 4.43 M/UL
RBC # FLD: 15.2 %
SODIUM SERPL-SCNC: 138 MMOL/L
T4 FREE SERPL-MCNC: 1.3 NG/DL
TSH SERPL-ACNC: 4.4 UIU/ML
WBC # FLD AUTO: 8.59 K/UL

## 2021-05-07 PROCEDURE — 99213 OFFICE O/P EST LOW 20 MIN: CPT | Mod: 25

## 2021-05-07 PROCEDURE — 36415 COLL VENOUS BLD VENIPUNCTURE: CPT

## 2021-05-07 PROCEDURE — 99072 ADDL SUPL MATRL&STAF TM PHE: CPT

## 2021-05-07 RX ORDER — BETAMETHASONE DIPROPIONATE 0.5 MG/G
0.05 LOTION TOPICAL
Qty: 60 | Refills: 0 | Status: DISCONTINUED | COMMUNITY
Start: 2019-07-10 | End: 2021-05-07

## 2021-05-07 RX ORDER — BETAMETHASONE VALERATE 1 MG/ML
0.1 LOTION CUTANEOUS DAILY
Qty: 1 | Refills: 0 | Status: DISCONTINUED | COMMUNITY
Start: 2020-10-09 | End: 2021-05-07

## 2021-05-07 NOTE — HISTORY OF PRESENT ILLNESS
[FreeTextEntry1] : Followup rhinitis, poor sleep, fatigue [de-identified] : 55-year-old female presents complaining of fatigue, poor sleep. She occasionally takes Xanax which helps her fall asleep she is waking up early in the morning. Turns out with further questioning she is dozing off after dinner and doesn't go to bed and slept or 12:00 and then wakes up at about 5.\par \par Also complaining of continuing chronic cough for which she is seeing ENT. Claritin-D which she started has helped her runny nose but her chronic cough is only improved slightly.

## 2021-05-07 NOTE — REVIEW OF SYSTEMS
[Fatigue] : fatigue [Nasal Discharge] : nasal discharge [Postnasal Drip] : postnasal drip [Cough] : cough [Negative] : Heme/Lymph [Night Sweats] : no night sweats [Recent Change In Weight] : ~T no recent weight change [Earache] : no earache [Hoarseness] : no hoarseness [Sore Throat] : no sore throat [Shortness Of Breath] : no shortness of breath [Wheezing] : no wheezing [Dyspnea on Exertion] : no dyspnea on exertion

## 2021-05-07 NOTE — PLAN
[FreeTextEntry1] : 55-year-old female is noted presents with complaints of fatigue, runny nose, persistent dry cough. Advised to continue Claritin-D since it's helping her rhinitis, followup with ENT regarding cough and postnasal drip. Discussed sleep habits and sleep hygiene at length. Advised no falling asleep in the early evening and to go to bed by 11 PM. She will report back once she makes the changes.

## 2021-05-11 ENCOUNTER — NON-APPOINTMENT (OUTPATIENT)
Age: 56
End: 2021-05-11

## 2021-06-21 ENCOUNTER — APPOINTMENT (OUTPATIENT)
Dept: INTERNAL MEDICINE | Facility: CLINIC | Age: 56
End: 2021-06-21
Payer: MEDICAID

## 2021-06-21 VITALS
DIASTOLIC BLOOD PRESSURE: 56 MMHG | WEIGHT: 170 LBS | HEIGHT: 64 IN | BODY MASS INDEX: 29.02 KG/M2 | TEMPERATURE: 97.8 F | RESPIRATION RATE: 16 BRPM | SYSTOLIC BLOOD PRESSURE: 96 MMHG

## 2021-06-21 DIAGNOSIS — Z20.822 CONTACT WITH AND (SUSPECTED) EXPOSURE TO COVID-19: ICD-10-CM

## 2021-06-21 DIAGNOSIS — Z92.29 PERSONAL HISTORY OF OTHER DRUG THERAPY: ICD-10-CM

## 2021-06-21 PROCEDURE — 99213 OFFICE O/P EST LOW 20 MIN: CPT

## 2021-06-21 PROCEDURE — 99072 ADDL SUPL MATRL&STAF TM PHE: CPT

## 2021-06-21 RX ORDER — FLUTICASONE PROPIONATE 50 UG/1
50 SPRAY, METERED NASAL
Qty: 16 | Refills: 0 | Status: COMPLETED | COMMUNITY
Start: 2020-12-04 | End: 2021-06-21

## 2021-06-21 RX ORDER — AZELASTINE HYDROCHLORIDE 137 UG/1
0.1 SPRAY, METERED NASAL
Qty: 30 | Refills: 0 | Status: COMPLETED | COMMUNITY
Start: 2020-12-04 | End: 2021-06-21

## 2021-06-21 NOTE — PLAN
[FreeTextEntry1] : 55-year-old female is noted with left lower part abdominal pain. CAT scan of abdomen and pelvis ordered, she has an appointment for tomorrow at 2 PM, prior authorization needed. Advised bland foods, clear liquids and if pain increases anytime before CAT scan go to the emergency room otherwise follow up with me when results available.

## 2021-06-21 NOTE — HISTORY OF PRESENT ILLNESS
[FreeTextEntry8] : 55-year-old female complaining of constipation, abdominal pain for over a week. She took ex-lax with poor results and now the pain has localized left lower quadrant, very tender to palpation. Denies fever chills

## 2021-06-21 NOTE — PHYSICAL EXAM
[No Acute Distress] : no acute distress [Well Nourished] : well nourished [Well Developed] : well developed [Well-Appearing] : well-appearing [Normal Sclera/Conjunctiva] : normal sclera/conjunctiva [PERRL] : pupils equal round and reactive to light [EOMI] : extraocular movements intact [Normal Outer Ear/Nose] : the outer ears and nose were normal in appearance [Normal Oropharynx] : the oropharynx was normal [No JVD] : no jugular venous distention [No Lymphadenopathy] : no lymphadenopathy [Supple] : supple [Thyroid Normal, No Nodules] : the thyroid was normal and there were no nodules present [No Respiratory Distress] : no respiratory distress  [No Accessory Muscle Use] : no accessory muscle use [Clear to Auscultation] : lungs were clear to auscultation bilaterally [Normal Rate] : normal rate  [Regular Rhythm] : with a regular rhythm [Normal S1, S2] : normal S1 and S2 [No Murmur] : no murmur heard [No Carotid Bruits] : no carotid bruits [No Abdominal Bruit] : a ~M bruit was not heard ~T in the abdomen [No Varicosities] : no varicosities [Pedal Pulses Present] : the pedal pulses are present [No Edema] : there was no peripheral edema [No Palpable Aorta] : no palpable aorta [No Extremity Clubbing/Cyanosis] : no extremity clubbing/cyanosis [Soft] : abdomen soft [Non-distended] : non-distended [No Masses] : no abdominal mass palpated [No HSM] : no HSM [Normal Bowel Sounds] : normal bowel sounds [Normal Posterior Cervical Nodes] : no posterior cervical lymphadenopathy [Normal Anterior Cervical Nodes] : no anterior cervical lymphadenopathy [No CVA Tenderness] : no CVA  tenderness [No Spinal Tenderness] : no spinal tenderness [No Joint Swelling] : no joint swelling [Grossly Normal Strength/Tone] : grossly normal strength/tone [No Rash] : no rash [Coordination Grossly Intact] : coordination grossly intact [No Focal Deficits] : no focal deficits [Normal Gait] : normal gait [Deep Tendon Reflexes (DTR)] : deep tendon reflexes were 2+ and symmetric [Normal Affect] : the affect was normal [Normal Insight/Judgement] : insight and judgment were intact [de-identified] : Tender to palpation left lower quadrant

## 2021-06-21 NOTE — REVIEW OF SYSTEMS
[Abdominal Pain] : abdominal pain [Constipation] : constipation [Nausea] : no nausea [Diarrhea] : diarrhea [Vomiting] : no vomiting [Heartburn] : no heartburn [Melena] : no melena [Negative] : Heme/Lymph

## 2021-06-21 NOTE — PHYSICAL EXAM
[No Acute Distress] : no acute distress [Well Nourished] : well nourished [Well Developed] : well developed [Well-Appearing] : well-appearing [Normal Sclera/Conjunctiva] : normal sclera/conjunctiva [PERRL] : pupils equal round and reactive to light [EOMI] : extraocular movements intact [Normal Outer Ear/Nose] : the outer ears and nose were normal in appearance [Normal Oropharynx] : the oropharynx was normal [No JVD] : no jugular venous distention [No Lymphadenopathy] : no lymphadenopathy [Supple] : supple [Thyroid Normal, No Nodules] : the thyroid was normal and there were no nodules present [No Respiratory Distress] : no respiratory distress  [No Accessory Muscle Use] : no accessory muscle use [Clear to Auscultation] : lungs were clear to auscultation bilaterally [Normal Rate] : normal rate  [Regular Rhythm] : with a regular rhythm [Normal S1, S2] : normal S1 and S2 [No Murmur] : no murmur heard [No Carotid Bruits] : no carotid bruits [No Abdominal Bruit] : a ~M bruit was not heard ~T in the abdomen [No Varicosities] : no varicosities [Pedal Pulses Present] : the pedal pulses are present [No Edema] : there was no peripheral edema [No Palpable Aorta] : no palpable aorta [No Extremity Clubbing/Cyanosis] : no extremity clubbing/cyanosis [Soft] : abdomen soft [Non-distended] : non-distended [No Masses] : no abdominal mass palpated [No HSM] : no HSM [Normal Bowel Sounds] : normal bowel sounds [Normal Posterior Cervical Nodes] : no posterior cervical lymphadenopathy [Normal Anterior Cervical Nodes] : no anterior cervical lymphadenopathy [No CVA Tenderness] : no CVA  tenderness [No Spinal Tenderness] : no spinal tenderness [No Joint Swelling] : no joint swelling [Grossly Normal Strength/Tone] : grossly normal strength/tone [No Rash] : no rash [Coordination Grossly Intact] : coordination grossly intact [No Focal Deficits] : no focal deficits [Normal Gait] : normal gait [Deep Tendon Reflexes (DTR)] : deep tendon reflexes were 2+ and symmetric [Normal Affect] : the affect was normal [Normal Insight/Judgement] : insight and judgment were intact [de-identified] : Tender to palpation left lower quadrant

## 2021-07-08 ENCOUNTER — RESULT REVIEW (OUTPATIENT)
Age: 56
End: 2021-07-08

## 2021-07-12 ENCOUNTER — NON-APPOINTMENT (OUTPATIENT)
Age: 56
End: 2021-07-12

## 2021-07-12 ENCOUNTER — APPOINTMENT (OUTPATIENT)
Dept: INTERNAL MEDICINE | Facility: CLINIC | Age: 56
End: 2021-07-12
Payer: MEDICAID

## 2021-07-12 VITALS
HEIGHT: 64 IN | WEIGHT: 172 LBS | BODY MASS INDEX: 29.37 KG/M2 | SYSTOLIC BLOOD PRESSURE: 110 MMHG | TEMPERATURE: 99.6 F | DIASTOLIC BLOOD PRESSURE: 60 MMHG

## 2021-07-12 PROCEDURE — 99496 TRANSJ CARE MGMT HIGH F2F 7D: CPT

## 2021-07-12 PROCEDURE — 99072 ADDL SUPL MATRL&STAF TM PHE: CPT

## 2021-07-12 NOTE — PLAN
[FreeTextEntry1] : 55-year-old female is noted presents for hospital discharge followup, she feels well, appetite is fine, pain is improved. She has colonoscopy scheduled for about one week. She will discuss repeating PET scan with gastroenterologist. She has followup appointment with Dr. Knox for a lung CT, she will check with him to make sure that Dr. Granda has seen PET scan done in March.\par \par Given prescription to repeat blood cultures\par Followup p.r.n.

## 2021-07-12 NOTE — PHYSICAL EXAM
[No Acute Distress] : no acute distress [Well Nourished] : well nourished [Well Developed] : well developed 2 seconds or less [Well-Appearing] : well-appearing [Normal Sclera/Conjunctiva] : normal sclera/conjunctiva [PERRL] : pupils equal round and reactive to light [EOMI] : extraocular movements intact [Normal Outer Ear/Nose] : the outer ears and nose were normal in appearance [Normal Oropharynx] : the oropharynx was normal [No JVD] : no jugular venous distention [No Lymphadenopathy] : no lymphadenopathy [Supple] : supple [Thyroid Normal, No Nodules] : the thyroid was normal and there were no nodules present [No Respiratory Distress] : no respiratory distress  [No Accessory Muscle Use] : no accessory muscle use [Clear to Auscultation] : lungs were clear to auscultation bilaterally [Normal Rate] : normal rate  [Regular Rhythm] : with a regular rhythm [Normal S1, S2] : normal S1 and S2 [No Murmur] : no murmur heard [No Carotid Bruits] : no carotid bruits [No Abdominal Bruit] : a ~M bruit was not heard ~T in the abdomen [No Varicosities] : no varicosities [Pedal Pulses Present] : the pedal pulses are present [No Edema] : there was no peripheral edema [No Palpable Aorta] : no palpable aorta [No Extremity Clubbing/Cyanosis] : no extremity clubbing/cyanosis [Soft] : abdomen soft [Non Tender] : non-tender [Non-distended] : non-distended [No Masses] : no abdominal mass palpated [No HSM] : no HSM [Normal Bowel Sounds] : normal bowel sounds [Normal Posterior Cervical Nodes] : no posterior cervical lymphadenopathy [Normal Anterior Cervical Nodes] : no anterior cervical lymphadenopathy [No CVA Tenderness] : no CVA  tenderness [No Spinal Tenderness] : no spinal tenderness [No Joint Swelling] : no joint swelling [Grossly Normal Strength/Tone] : grossly normal strength/tone [No Rash] : no rash [Coordination Grossly Intact] : coordination grossly intact [No Focal Deficits] : no focal deficits [Normal Gait] : normal gait [Deep Tendon Reflexes (DTR)] : deep tendon reflexes were 2+ and symmetric [Normal Affect] : the affect was normal [Normal Insight/Judgement] : insight and judgment were intact

## 2021-07-12 NOTE — HISTORY OF PRESENT ILLNESS
[Post-hospitalization from ___ Hospital] : Post-hospitalization from [unfilled] Hospital [Admitted on: ___] : The patient was admitted on [unfilled] [Discharged on ___] : discharged on [unfilled] [Discharge Summary] : discharge summary [Pertinent Labs] : pertinent labs [Radiology Findings] : radiology findings [Discharge Med List] : discharge medication list [Med Reconciliation] : medication reconciliation has been completed [Patient Contacted By: ____] : and contacted by [unfilled] [FreeTextEntry2] : 55-year-old female was in this office June 21 complaining of abdominal pain for over 1 week localized to left lower quadrant.She was afebrile, no vomiting, diarrhea but she was complaining of constipation. I ordered abdomen/pelvic CAT scan but was unable to get it approved by her insurance company after several days. I advised her if pain did not resolve to go to the emergency room which she did on July 9. CAT scan showed retroperitoneal mass, she was admitted to rule out malignancy. PET scan 3/21 did not show any abnormality but Dr. Granda wanted to see PET to compare to current CT scan. At this point today is unclear of Dr. Granda has seen the PET scan, patient states that Dr. Knox has the disc but it appears they were in touch with each other while the patient was in the hospital. According to discharge papers PET scan is to be repeated in one month.\par \par One of the blood cultures came back positive, most likely contamination but patient states that she was advised that it needs to be repeated, I gave her prescription to have it done at [facility].\par \par Patient feels well, she is scheduled for colonoscopy with Dr. Nunez and she will followup after that

## 2021-08-27 DIAGNOSIS — Z01.812 ENCOUNTER FOR PREPROCEDURAL LABORATORY EXAMINATION: ICD-10-CM

## 2021-09-01 ENCOUNTER — APPOINTMENT (OUTPATIENT)
Dept: ENDOCRINOLOGY | Facility: CLINIC | Age: 56
End: 2021-09-01

## 2021-09-02 ENCOUNTER — LABORATORY RESULT (OUTPATIENT)
Age: 56
End: 2021-09-02

## 2021-09-03 ENCOUNTER — RESULT REVIEW (OUTPATIENT)
Age: 56
End: 2021-09-03

## 2021-09-10 ENCOUNTER — RESULT REVIEW (OUTPATIENT)
Age: 56
End: 2021-09-10

## 2021-09-10 ENCOUNTER — NON-APPOINTMENT (OUTPATIENT)
Age: 56
End: 2021-09-10

## 2021-09-13 ENCOUNTER — APPOINTMENT (OUTPATIENT)
Dept: INTERNAL MEDICINE | Facility: CLINIC | Age: 56
End: 2021-09-13
Payer: MEDICAID

## 2021-09-13 VITALS
HEIGHT: 64 IN | BODY MASS INDEX: 28.17 KG/M2 | DIASTOLIC BLOOD PRESSURE: 60 MMHG | WEIGHT: 165 LBS | SYSTOLIC BLOOD PRESSURE: 112 MMHG

## 2021-09-13 PROCEDURE — 99213 OFFICE O/P EST LOW 20 MIN: CPT

## 2021-09-13 NOTE — PLAN
[FreeTextEntry1] : 55-year-old female presents for followup of left lower quadrant abdominal pain which is better but still uncomfortable and limits her activities i.e. bending at the waist. Reviewed CAT scan, advised there is no change since July CAT scan. Referred to Dr. Garay for followup. She has an appointment with Dr. Knox tomorrow to review chest CT\par \par Followup p.r.n.

## 2021-09-13 NOTE — HISTORY OF PRESENT ILLNESS
[FreeTextEntry1] : Followup abdominal mass, CAT scan [de-identified] : 55-year-old female presents for followup abdominal pain, abdominal mass and recent CAT scan. Abdominal pain is better but she still can't bend over, for instance to do the cat litter et cetera. There's been no change in mass on CAT scan from July CAT scan. Referred back to Dr. Garay for continued monitoring as one possibility is sarcoma\par \par Patient's appetite is good, no weight loss

## 2021-09-14 ENCOUNTER — APPOINTMENT (OUTPATIENT)
Dept: PULMONOLOGY | Facility: CLINIC | Age: 56
End: 2021-09-14
Payer: MEDICAID

## 2021-09-14 VITALS
HEIGHT: 63 IN | DIASTOLIC BLOOD PRESSURE: 57 MMHG | BODY MASS INDEX: 29.23 KG/M2 | WEIGHT: 165 LBS | HEART RATE: 77 BPM | SYSTOLIC BLOOD PRESSURE: 114 MMHG | OXYGEN SATURATION: 97 %

## 2021-09-14 PROCEDURE — 99214 OFFICE O/P EST MOD 30 MIN: CPT

## 2021-09-14 NOTE — PHYSICAL EXAM
[No Acute Distress] : no acute distress [Erythema] : erythema [Normal Appearance] : normal appearance [No Neck Mass] : no neck mass [Normal Rate/Rhythm] : normal rate/rhythm [Normal S1, S2] : normal s1, s2 [No Resp Distress] : no resp distress [No Acc Muscle Use] : no acc muscle use [Clear to Auscultation Bilaterally] : clear to auscultation bilaterally [No Abnormalities] : no abnormalities [Normal Gait] : normal gait [No Clubbing] : no clubbing [No Cyanosis] : no cyanosis [No Edema] : no edema [No Focal Deficits] : no focal deficits [Oriented x3] : oriented x3 [Normal Affect] : normal affect [TextBox_11] : mucus

## 2021-09-14 NOTE — HISTORY OF PRESENT ILLNESS
[TextBox_4] : 55 year old female with lung nodules comes for f/u after latest LDCT.\par LDCT seen, my read there is a new tubular density in the RtLL compared to previous. The previous nodules, including a spiculated nodule in the Rt apex are unchanged.\par The case was discussed at our weekly lung cancer screening meeting.\par She had a PET after the initial LDCT which did not show any uptake.\par \par She c/o cough throughout the day, especially when she is anxious. It is a dry cough. It is associated with throat clearing.\par Sometimes this cough causes bronchospasm\par Denies hemoptysis. No weight loss.\par She did not have more cough or sputum production when she had the CT chest\par Does not smoke anymore\par \par \par

## 2021-09-15 ENCOUNTER — NON-APPOINTMENT (OUTPATIENT)
Age: 56
End: 2021-09-15

## 2021-09-23 ENCOUNTER — RX RENEWAL (OUTPATIENT)
Age: 56
End: 2021-09-23

## 2021-09-28 ENCOUNTER — APPOINTMENT (OUTPATIENT)
Dept: HEMATOLOGY ONCOLOGY | Facility: CLINIC | Age: 56
End: 2021-09-28
Payer: MEDICAID

## 2021-09-28 ENCOUNTER — RESULT REVIEW (OUTPATIENT)
Age: 56
End: 2021-09-28

## 2021-09-28 VITALS
BODY MASS INDEX: 28.39 KG/M2 | HEART RATE: 68 BPM | RESPIRATION RATE: 16 BRPM | TEMPERATURE: 97.6 F | HEIGHT: 64.17 IN | SYSTOLIC BLOOD PRESSURE: 120 MMHG | OXYGEN SATURATION: 98 % | DIASTOLIC BLOOD PRESSURE: 63 MMHG | WEIGHT: 166.29 LBS

## 2021-09-28 DIAGNOSIS — H60.543 ACUTE ECZEMATOID OTITIS EXTERNA, BILATERAL: ICD-10-CM

## 2021-09-28 DIAGNOSIS — Z80.0 FAMILY HISTORY OF MALIGNANT NEOPLASM OF DIGESTIVE ORGANS: ICD-10-CM

## 2021-09-28 DIAGNOSIS — R05 COUGH: ICD-10-CM

## 2021-09-28 LAB
T3 SERPL-MCNC: 90 NG/DL
T4 FREE SERPL-MCNC: 1.4 NG/DL
THYROGLOB AB SERPL-ACNC: 1577 IU/ML
THYROPEROXIDASE AB SERPL IA-ACNC: 3023 IU/ML
TSH SERPL-ACNC: 1.09 UIU/ML

## 2021-09-28 PROCEDURE — 99205 OFFICE O/P NEW HI 60 MIN: CPT

## 2021-09-28 RX ORDER — CLINDAMYCIN PHOSPHATE 10 MG/ML
1 LOTION TOPICAL
Qty: 60 | Refills: 0 | Status: COMPLETED | COMMUNITY
Start: 2021-02-26 | End: 2021-09-28

## 2021-10-02 ENCOUNTER — RESULT REVIEW (OUTPATIENT)
Age: 56
End: 2021-10-02

## 2021-10-02 NOTE — REVIEW OF SYSTEMS
[Fatigue] : fatigue [Recent Change In Weight] : ~T recent weight change [Negative] : Allergic/Immunologic [FreeTextEntry9] : lower back pain

## 2021-10-02 NOTE — HISTORY OF PRESENT ILLNESS
[de-identified] : 55 year old female presents today for initial consultation of  a questionable retroperitoneal mass and lung nodules.  She presented to ER in July with complaints of abdominal pain x 2 weeks, she was found to have infiltration of the retroperitoneal mesentery, extensive shotty adenopathy, 4cm soft tissue mass, and was admitted for further management to rule out malignancy.  General Surgeon Dr. Butler consulted and recommended IR guided biopsy.  Outpatient Pulmonologist Dr. Knox consulted.  Outpatient PET scan which showed no uptake in lungs or abdomen and has repeat CT Chest in September 2021.  No IR or surgical intervention recommended at this time.  \par \par PET SCAN 3/2021- No evidence of hypermetabolic focus in the lung parenchyma. Bilateral pulmonary opacities- F/u 6 month diagnostic CT chest is advised.  No evidence of hypermetabolic activity in the mediastinal and cervical nodes.  Diffuse hypermetabolic activity in the thyroid gland can be secondary to thyroiditis.  \par \par CT chest 9/10/2021- Multiple diverticuli in lower descending colon and sigmoid colon, without evidence of acute diverticulitis.  Multiple calculi in normal sized gallbladder, without additional CT signs of acute cholecystitis no evidence of biliary duct dilatation.  No change in hazy increased density in mesentery, multiple small mesenteric lymph nodes, or in\par lobulated left mesenteric mass that is partially calcified and measures approximately 4 x 2 x 3 cm, since prior CT of 7/8/2021.  No other evidence of acute abdominal or pelvic pathology.\par \par Colonoscopy done- Aug 26, 2021- pt reports WNL with Dr. Nunez\par \par Family Hx- MGM lung cancer (smoker) PGM metastatic breast cancer postmenopausal, father with colon cancer- alive, paternal cousin with colon cancer- alive \par Smoking Hx- smoked for 29 years, quit 16 years ago, smoked 2 PPD

## 2021-10-02 NOTE — ASSESSMENT
[FreeTextEntry1] : 54 yo female\par - weight loss\par - abdominal pain with eating, wakes her up at night\par - CT with retroperitoneal mass suspicious for mesenteric sclerosis\par \par Plan:\par - labs ordered\par - biopsy - d/w Dr. Granda\par - images and records reviewed

## 2021-10-04 ENCOUNTER — RESULT REVIEW (OUTPATIENT)
Age: 56
End: 2021-10-04

## 2021-10-05 ENCOUNTER — RESULT REVIEW (OUTPATIENT)
Age: 56
End: 2021-10-05

## 2021-10-18 ENCOUNTER — RESULT REVIEW (OUTPATIENT)
Age: 56
End: 2021-10-18

## 2021-10-19 ENCOUNTER — RESULT REVIEW (OUTPATIENT)
Age: 56
End: 2021-10-19

## 2021-10-19 ENCOUNTER — APPOINTMENT (OUTPATIENT)
Dept: HEMATOLOGY ONCOLOGY | Facility: CLINIC | Age: 56
End: 2021-10-19
Payer: MEDICAID

## 2021-10-19 VITALS
WEIGHT: 168 LBS | BODY MASS INDEX: 28.68 KG/M2 | RESPIRATION RATE: 16 BRPM | HEART RATE: 67 BPM | HEIGHT: 64.17 IN | SYSTOLIC BLOOD PRESSURE: 119 MMHG | DIASTOLIC BLOOD PRESSURE: 71 MMHG | OXYGEN SATURATION: 99 % | TEMPERATURE: 97.5 F

## 2021-10-19 DIAGNOSIS — R63.4 ABNORMAL WEIGHT LOSS: ICD-10-CM

## 2021-10-19 PROCEDURE — 99214 OFFICE O/P EST MOD 30 MIN: CPT

## 2021-10-19 PROCEDURE — 36415 COLL VENOUS BLD VENIPUNCTURE: CPT

## 2021-10-19 NOTE — ASSESSMENT
[FreeTextEntry1] : 56 yo female\par - weight loss\par - abdominal pain with eating, wakes her up at night\par - CT with retroperitoneal mass suspicious for mesenteric sclerosis\par \par Plan:\par - labs ordered\par - biopsy - d/w Dr. 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 \par Referred to Dr. Fonseca to collaborate. - d/w Dr. Fonseca. \par Additional results pending from IgG 4 staining- d/w Dr Yahr.

## 2021-10-19 NOTE — HISTORY OF PRESENT ILLNESS
[de-identified] : patient is here for follow up for abdominal mass. Patient is overall feeling well with no new complaints except worsening fatigue.  [de-identified] : 55 year old female presents today for initial consultation of  a questionable retroperitoneal mass and lung nodules.  She presented to ER in July with complaints of abdominal pain x 2 weeks, she was found to have infiltration of the retroperitoneal mesentery, extensive shotty adenopathy, 4cm soft tissue mass, and was admitted for further management to rule out malignancy.  General Surgeon Dr. Butler consulted and recommended IR guided biopsy.  Outpatient Pulmonologist Dr. Knox consulted.  Outpatient PET scan which showed no uptake in lungs or abdomen and has repeat CT Chest in September 2021.  No IR or surgical intervention recommended at this time.  \par \par PET SCAN 3/2021- No evidence of hypermetabolic focus in the lung parenchyma. Bilateral pulmonary opacities- F/u 6 month diagnostic CT chest is advised.  No evidence of hypermetabolic activity in the mediastinal and cervical nodes.  Diffuse hypermetabolic activity in the thyroid gland can be secondary to thyroiditis.  \par \par CT chest 9/10/2021- Multiple diverticuli in lower descending colon and sigmoid colon, without evidence of acute diverticulitis.  Multiple calculi in normal sized gallbladder, without additional CT signs of acute cholecystitis no evidence of biliary duct dilatation.  No change in hazy increased density in mesentery, multiple small mesenteric lymph nodes, or in\par lobulated left mesenteric mass that is partially calcified and measures approximately 4 x 2 x 3 cm, since prior CT of 7/8/2021.  No other evidence of acute abdominal or pelvic pathology.\par \par Colonoscopy done- Aug 26, 2021- pt reports WNL with Dr. Nunez\par \par Family Hx- MGM lung cancer (smoker) PGM metastatic breast cancer postmenopausal, father with colon cancer- alive, paternal cousin with colon cancer- alive \par Smoking Hx- smoked for 29 years, quit 16 years ago, smoked 2 PPD

## 2021-10-19 NOTE — REVIEW OF SYSTEMS
[Fatigue] : fatigue [Recent Change In Weight] : ~T recent weight change [Negative] : Allergic/Immunologic [FreeTextEntry2] : worsening  [FreeTextEntry9] : lower back pain

## 2021-10-19 NOTE — REASON FOR VISIT
[Initial Consultation] : an initial consultation [Follow-Up Visit] : a follow-up [FreeTextEntry2] : abdominal mass

## 2021-11-09 ENCOUNTER — RESULT REVIEW (OUTPATIENT)
Age: 56
End: 2021-11-09

## 2021-11-09 ENCOUNTER — APPOINTMENT (OUTPATIENT)
Dept: HEMATOLOGY ONCOLOGY | Facility: CLINIC | Age: 56
End: 2021-11-09
Payer: MEDICAID

## 2021-11-09 VITALS
DIASTOLIC BLOOD PRESSURE: 72 MMHG | HEIGHT: 64.17 IN | SYSTOLIC BLOOD PRESSURE: 142 MMHG | RESPIRATION RATE: 18 BRPM | BODY MASS INDEX: 30.05 KG/M2 | TEMPERATURE: 96.9 F | OXYGEN SATURATION: 97 % | WEIGHT: 176 LBS | HEART RATE: 64 BPM

## 2021-11-09 PROCEDURE — 36415 COLL VENOUS BLD VENIPUNCTURE: CPT

## 2021-11-09 PROCEDURE — 99214 OFFICE O/P EST MOD 30 MIN: CPT | Mod: 25

## 2021-11-09 NOTE — ASSESSMENT
[FreeTextEntry1] : 56 yo female\par - weight loss\par - abdominal pain with eating, wakes her up at night\par - CT with retroperitoneal mass suspicious for mesenteric sclerosis\par \par Plan:\par - labs ordered\par - biopsy - d/w Dr. 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 \par Referred to Dr. Fonseca to collaborate. - d/w Dr. Fonseca. \par Additional results pending from IgG 4 staining- d/w Dr Yahr. \par \par 11/9/2021\par Patient tolerates well Prednisone - decreased abdominal pain, still some, less, continue 40 mg \par Increased weight - d/w patient to monitor diet.\par C/o back pain - overall better\par MOnitor labs\par Start Protonix

## 2021-11-09 NOTE — HISTORY OF PRESENT ILLNESS
[de-identified] : 55 year old female presents today for initial consultation of  a questionable retroperitoneal mass and lung nodules.  She presented to ER in July with complaints of abdominal pain x 2 weeks, she was found to have infiltration of the retroperitoneal mesentery, extensive shotty adenopathy, 4cm soft tissue mass, and was admitted for further management to rule out malignancy.  General Surgeon Dr. Butler consulted and recommended IR guided biopsy.  Outpatient Pulmonologist Dr. Knox consulted.  Outpatient PET scan which showed no uptake in lungs or abdomen and has repeat CT Chest in September 2021.  No IR or surgical intervention recommended at this time.  \par \par PET SCAN 3/2021- No evidence of hypermetabolic focus in the lung parenchyma. Bilateral pulmonary opacities- F/u 6 month diagnostic CT chest is advised.  No evidence of hypermetabolic activity in the mediastinal and cervical nodes.  Diffuse hypermetabolic activity in the thyroid gland can be secondary to thyroiditis.  \par \par CT chest 9/10/2021- Multiple diverticuli in lower descending colon and sigmoid colon, without evidence of acute diverticulitis.  Multiple calculi in normal sized gallbladder, without additional CT signs of acute cholecystitis no evidence of biliary duct dilatation.  No change in hazy increased density in mesentery, multiple small mesenteric lymph nodes, or in\par lobulated left mesenteric mass that is partially calcified and measures approximately 4 x 2 x 3 cm, since prior CT of 7/8/2021.  No other evidence of acute abdominal or pelvic pathology.\par \par Colonoscopy done- Aug 26, 2021- pt reports WNL with Dr. Nunez\par \par Family Hx- MGM lung cancer (smoker) PGM metastatic breast cancer postmenopausal, father with colon cancer- alive, paternal cousin with colon cancer- alive \par Smoking Hx- smoked for 29 years, quit 16 years ago, smoked 2 PPD [de-identified] : patient is here for follow up for abdominal mass. Patient is overall feeling well with no new complaints except worsening fatigue.

## 2021-11-17 ENCOUNTER — NON-APPOINTMENT (OUTPATIENT)
Age: 56
End: 2021-11-17

## 2021-11-17 DIAGNOSIS — R93.89 ABNORMAL FINDINGS ON DIAGNOSTIC IMAGING OF OTHER SPECIFIED BODY STRUCTURES: ICD-10-CM

## 2021-11-30 ENCOUNTER — NON-APPOINTMENT (OUTPATIENT)
Age: 56
End: 2021-11-30

## 2021-12-06 LAB
CYTOLOGY CVX/VAG DOC THIN PREP: NORMAL
HPV HIGH+LOW RISK DNA PNL CVX: NOT DETECTED

## 2022-01-18 ENCOUNTER — APPOINTMENT (OUTPATIENT)
Dept: HEMATOLOGY ONCOLOGY | Facility: CLINIC | Age: 57
End: 2022-01-18
Payer: MEDICAID

## 2022-02-11 ENCOUNTER — RX RENEWAL (OUTPATIENT)
Age: 57
End: 2022-02-11

## 2022-02-15 ENCOUNTER — RESULT REVIEW (OUTPATIENT)
Age: 57
End: 2022-02-15

## 2022-02-15 ENCOUNTER — APPOINTMENT (OUTPATIENT)
Dept: HEMATOLOGY ONCOLOGY | Facility: CLINIC | Age: 57
End: 2022-02-15
Payer: MEDICAID

## 2022-02-15 VITALS
DIASTOLIC BLOOD PRESSURE: 67 MMHG | RESPIRATION RATE: 18 BRPM | HEIGHT: 64.13 IN | BODY MASS INDEX: 28.99 KG/M2 | WEIGHT: 169.8 LBS | OXYGEN SATURATION: 97 % | TEMPERATURE: 97.2 F | SYSTOLIC BLOOD PRESSURE: 108 MMHG | HEART RATE: 65 BPM

## 2022-02-15 DIAGNOSIS — R10.32 LEFT LOWER QUADRANT PAIN: ICD-10-CM

## 2022-02-15 PROCEDURE — 99213 OFFICE O/P EST LOW 20 MIN: CPT

## 2022-02-15 PROCEDURE — 99072 ADDL SUPL MATRL&STAF TM PHE: CPT

## 2022-02-19 NOTE — ASSESSMENT
[FreeTextEntry1] : 54 yo female\par - weight loss\par - abdominal pain with eating, wakes her up at night\par - CT with retroperitoneal mass suspicious for mesenteric sclerosis\par \par Plan:\par - labs ordered\par - biopsy - d/w Dr. 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 \par Referred to Dr. Fonseca to collaborate. - d/w Dr. Fonseca. \par Additional results pending from IgG 4 staining- d/w Dr Yahr. \par \par 11/9/2021\par Patient tolerates well Prednisone - decreased abdominal pain, still some, less, continue 40 mg \par Increased weight - d/w patient to monitor diet.\par C/o back pain - overall better, \par Monitor labs\par Start Protonix \par \par 2/15/2022\par Sclerosing Mesenteritis.\par patient stopped Prednisone around [place]- did not taper\par Increasing abdominal pain started 48 hrs ago, increased inflammatory markers.\par Repeat CT scan, patient to see Dr. Fonseca.\par Reviewed with patient need for antiinflammatory treatment.  Patient concern about drug - drug interactions. \par

## 2022-02-19 NOTE — HISTORY OF PRESENT ILLNESS
[de-identified] : 55 year old female presents today for initial consultation of  a questionable retroperitoneal mass and lung nodules.  She presented to ER in July with complaints of abdominal pain x 2 weeks, she was found to have infiltration of the retroperitoneal mesentery, extensive shotty adenopathy, 4cm soft tissue mass, and was admitted for further management to rule out malignancy.  General Surgeon Dr. Butler consulted and recommended IR guided biopsy.  Outpatient Pulmonologist Dr. Knox consulted.  Outpatient PET scan which showed no uptake in lungs or abdomen and has repeat CT Chest in September 2021.  No IR or surgical intervention recommended at this time.  \par \par PET SCAN 3/2021- No evidence of hypermetabolic focus in the lung parenchyma. Bilateral pulmonary opacities- F/u 6 month diagnostic CT chest is advised.  No evidence of hypermetabolic activity in the mediastinal and cervical nodes.  Diffuse hypermetabolic activity in the thyroid gland can be secondary to thyroiditis.  \par \par CT chest 9/10/2021- Multiple diverticuli in lower descending colon and sigmoid colon, without evidence of acute diverticulitis.  Multiple calculi in normal sized gallbladder, without additional CT signs of acute cholecystitis no evidence of biliary duct dilatation.  No change in hazy increased density in mesentery, multiple small mesenteric lymph nodes, or in\par lobulated left mesenteric mass that is partially calcified and measures approximately 4 x 2 x 3 cm, since prior CT of 7/8/2021.  No other evidence of acute abdominal or pelvic pathology.\par \par Colonoscopy done- Aug 26, 2021- pt reports WNL with Dr. Nunez\par \par Family Hx- MGM lung cancer (smoker) PGM metastatic breast cancer postmenopausal, father with colon cancer- alive, paternal cousin with colon cancer- alive \par Smoking Hx- smoked for 29 years, quit 16 years ago, smoked 2 PPD [de-identified] : patient is here for follow up for abdominal mass. Patient is having abdominal pain

## 2022-03-11 ENCOUNTER — APPOINTMENT (OUTPATIENT)
Dept: PULMONOLOGY | Facility: CLINIC | Age: 57
End: 2022-03-11
Payer: MEDICAID

## 2022-03-11 VITALS
DIASTOLIC BLOOD PRESSURE: 56 MMHG | OXYGEN SATURATION: 98 % | BODY MASS INDEX: 29.77 KG/M2 | HEIGHT: 63 IN | RESPIRATION RATE: 18 BRPM | SYSTOLIC BLOOD PRESSURE: 123 MMHG | WEIGHT: 168 LBS | TEMPERATURE: 97.1 F | HEART RATE: 81 BPM

## 2022-03-11 PROCEDURE — 99072 ADDL SUPL MATRL&STAF TM PHE: CPT

## 2022-03-11 PROCEDURE — 99213 OFFICE O/P EST LOW 20 MIN: CPT

## 2022-03-11 NOTE — HISTORY OF PRESENT ILLNESS
[TextBox_4] : 56 year old female with lung nodules came for f/u.\par Last seen in Sept 2021. The CT scheduled for 3 months was denied by insurance.\par CT chest in Sept showed a new 2 cm tubular density in the RtLL.\par She feels OK.\par No new respiratory complaints\par Has cough with post nasal drip and when she is anxious\par No fever, chills, hemoptysis\par Uses inhaler PRN\par Does not smoke

## 2022-03-14 ENCOUNTER — APPOINTMENT (OUTPATIENT)
Dept: RHEUMATOLOGY | Facility: CLINIC | Age: 57
End: 2022-03-14
Payer: MEDICAID

## 2022-03-14 VITALS
HEIGHT: 63 IN | DIASTOLIC BLOOD PRESSURE: 70 MMHG | WEIGHT: 169 LBS | BODY MASS INDEX: 29.95 KG/M2 | SYSTOLIC BLOOD PRESSURE: 100 MMHG

## 2022-03-14 PROCEDURE — 99205 OFFICE O/P NEW HI 60 MIN: CPT | Mod: 25

## 2022-03-15 LAB
25(OH)D3 SERPL-MCNC: 21.7 NG/ML
ALBUMIN SERPL ELPH-MCNC: 4.4 G/DL
ALP BLD-CCNC: 72 U/L
ALT SERPL-CCNC: 13 U/L
ANION GAP SERPL CALC-SCNC: 15 MMOL/L
AST SERPL-CCNC: 23 U/L
BASOPHILS # BLD AUTO: 0.05 K/UL
BASOPHILS NFR BLD AUTO: 0.7 %
BILIRUB SERPL-MCNC: 0.2 MG/DL
BUN SERPL-MCNC: 10 MG/DL
CALCIUM SERPL-MCNC: 9.6 MG/DL
CHLORIDE SERPL-SCNC: 100 MMOL/L
CO2 SERPL-SCNC: 24 MMOL/L
CREAT SERPL-MCNC: 0.74 MG/DL
CRP SERPL-MCNC: 12 MG/L
DEPRECATED KAPPA LC FREE/LAMBDA SER: 1.34 RATIO
EGFR: 94 ML/MIN/1.73M2
EOSINOPHIL # BLD AUTO: 0.26 K/UL
EOSINOPHIL NFR BLD AUTO: 3.8 %
ERYTHROCYTE [SEDIMENTATION RATE] IN BLOOD BY WESTERGREN METHOD: 95 MM/HR
GLUCOSE SERPL-MCNC: 94 MG/DL
HCT VFR BLD CALC: 36.9 %
HGB BLD-MCNC: 11.8 G/DL
IGA SER QL IEP: 166 MG/DL
IGG SER QL IEP: 999 MG/DL
IGM SER QL IEP: 212 MG/DL
IMM GRANULOCYTES NFR BLD AUTO: 0.1 %
KAPPA LC CSF-MCNC: 1.64 MG/DL
KAPPA LC SERPL-MCNC: 2.19 MG/DL
LYMPHOCYTES # BLD AUTO: 1.86 K/UL
LYMPHOCYTES NFR BLD AUTO: 27 %
MAN DIFF?: NORMAL
MCHC RBC-ENTMCNC: 29.2 PG
MCHC RBC-ENTMCNC: 32 GM/DL
MCV RBC AUTO: 91.3 FL
MONOCYTES # BLD AUTO: 0.54 K/UL
MONOCYTES NFR BLD AUTO: 7.8 %
NEUTROPHILS # BLD AUTO: 4.17 K/UL
NEUTROPHILS NFR BLD AUTO: 60.6 %
PLATELET # BLD AUTO: 305 K/UL
POTASSIUM SERPL-SCNC: 4.7 MMOL/L
PROT SERPL-MCNC: 6.9 G/DL
RBC # BLD: 4.04 M/UL
RBC # FLD: 13.5 %
RHEUMATOID FACT SER QL: <10 IU/ML
SODIUM SERPL-SCNC: 139 MMOL/L
VIT B12 SERPL-MCNC: 343 PG/ML
WBC # FLD AUTO: 6.89 K/UL

## 2022-03-16 LAB
ANA SER IF-ACNC: NEGATIVE
CCP AB SER IA-ACNC: <8 UNITS
RF+CCP IGG SER-IMP: NEGATIVE

## 2022-03-18 ENCOUNTER — RESULT REVIEW (OUTPATIENT)
Age: 57
End: 2022-03-18

## 2022-03-19 ENCOUNTER — RESULT REVIEW (OUTPATIENT)
Age: 57
End: 2022-03-19

## 2022-03-22 LAB
HLA-B27 RELATED AG QL: NEGATIVE
IGG SUBSET TOTAL IGG: 1070 MG/DL
IGG1 SER-MCNC: 455 MG/DL
IGG2 SER-MCNC: 416 MG/DL
IGG3 SER-MCNC: 16 MG/DL
IGG4 SER-MCNC: 82 MG/DL

## 2022-04-07 ENCOUNTER — LABORATORY RESULT (OUTPATIENT)
Age: 57
End: 2022-04-07

## 2022-04-07 ENCOUNTER — APPOINTMENT (OUTPATIENT)
Dept: INTERNAL MEDICINE | Facility: CLINIC | Age: 57
End: 2022-04-07
Payer: MEDICAID

## 2022-04-07 VITALS
DIASTOLIC BLOOD PRESSURE: 60 MMHG | WEIGHT: 170 LBS | BODY MASS INDEX: 30.12 KG/M2 | HEIGHT: 63 IN | SYSTOLIC BLOOD PRESSURE: 90 MMHG

## 2022-04-07 DIAGNOSIS — Z12.31 ENCOUNTER FOR SCREENING MAMMOGRAM FOR MALIGNANT NEOPLASM OF BREAST: ICD-10-CM

## 2022-04-07 PROCEDURE — 93000 ELECTROCARDIOGRAM COMPLETE: CPT

## 2022-04-07 PROCEDURE — 99396 PREV VISIT EST AGE 40-64: CPT | Mod: 25

## 2022-04-07 RX ORDER — ALPRAZOLAM 1 MG/1
1 TABLET ORAL
Refills: 0 | Status: DISCONTINUED | COMMUNITY
End: 2022-04-07

## 2022-04-07 RX ORDER — FLUTICASONE PROPIONATE 0.5 MG/G
0.05 CREAM TOPICAL
Qty: 15 | Refills: 0 | Status: DISCONTINUED | COMMUNITY
Start: 2021-02-12 | End: 2022-04-07

## 2022-04-07 RX ORDER — FLUOXETINE HYDROCHLORIDE 10 MG/1
10 CAPSULE ORAL
Refills: 0 | Status: DISCONTINUED | COMMUNITY
End: 2022-04-07

## 2022-04-07 RX ORDER — KETOCONAZOLE 20.5 MG/ML
2 SHAMPOO, SUSPENSION TOPICAL
Qty: 120 | Refills: 0 | Status: DISCONTINUED | COMMUNITY
Start: 2021-04-15 | End: 2022-04-07

## 2022-04-07 RX ORDER — PREDNISONE 20 MG/1
20 TABLET ORAL
Qty: 60 | Refills: 1 | Status: DISCONTINUED | COMMUNITY
Start: 2021-10-19 | End: 2022-04-07

## 2022-04-08 ENCOUNTER — NON-APPOINTMENT (OUTPATIENT)
Age: 57
End: 2022-04-08

## 2022-04-08 LAB
25(OH)D3 SERPL-MCNC: 22 NG/ML
ALBUMIN SERPL ELPH-MCNC: 4.6 G/DL
ALP BLD-CCNC: 79 U/L
ALT SERPL-CCNC: 13 U/L
ANION GAP SERPL CALC-SCNC: 14 MMOL/L
APPEARANCE: CLEAR
AST SERPL-CCNC: 21 U/L
BASOPHILS # BLD AUTO: 0.06 K/UL
BASOPHILS NFR BLD AUTO: 0.8 %
BILIRUB SERPL-MCNC: 0.3 MG/DL
BILIRUBIN URINE: NEGATIVE
BLOOD URINE: NEGATIVE
BUN SERPL-MCNC: 14 MG/DL
CALCIUM SERPL-MCNC: 9.4 MG/DL
CHLORIDE SERPL-SCNC: 102 MMOL/L
CHOLEST SERPL-MCNC: 241 MG/DL
CO2 SERPL-SCNC: 25 MMOL/L
COLOR: NORMAL
CREAT SERPL-MCNC: 0.7 MG/DL
EGFR: 100 ML/MIN/1.73M2
EOSINOPHIL # BLD AUTO: 0.31 K/UL
EOSINOPHIL NFR BLD AUTO: 4.4 %
FOLATE SERPL-MCNC: 9.9 NG/ML
GLUCOSE QUALITATIVE U: NEGATIVE
GLUCOSE SERPL-MCNC: 105 MG/DL
HCT VFR BLD CALC: 41.8 %
HDLC SERPL-MCNC: 54 MG/DL
HGB BLD-MCNC: 13.3 G/DL
IMM GRANULOCYTES NFR BLD AUTO: 0.4 %
KETONES URINE: NEGATIVE
LDLC SERPL CALC-MCNC: 160 MG/DL
LEUKOCYTE ESTERASE URINE: ABNORMAL
LYMPHOCYTES # BLD AUTO: 1.61 K/UL
LYMPHOCYTES NFR BLD AUTO: 22.6 %
MAN DIFF?: NORMAL
MCHC RBC-ENTMCNC: 28.9 PG
MCHC RBC-ENTMCNC: 31.8 GM/DL
MCV RBC AUTO: 90.9 FL
MONOCYTES # BLD AUTO: 0.5 K/UL
MONOCYTES NFR BLD AUTO: 7 %
NEUTROPHILS # BLD AUTO: 4.6 K/UL
NEUTROPHILS NFR BLD AUTO: 64.8 %
NITRITE URINE: NEGATIVE
NONHDLC SERPL-MCNC: 187 MG/DL
PH URINE: 5.5
PLATELET # BLD AUTO: 300 K/UL
POTASSIUM SERPL-SCNC: 4.7 MMOL/L
PROT SERPL-MCNC: 7.5 G/DL
PROTEIN URINE: NEGATIVE
RBC # BLD: 4.6 M/UL
RBC # FLD: 13.9 %
SODIUM SERPL-SCNC: 140 MMOL/L
SPECIFIC GRAVITY URINE: 1.02
T4 FREE SERPL-MCNC: 1.4 NG/DL
TRIGL SERPL-MCNC: 139 MG/DL
TSH SERPL-ACNC: 0.8 UIU/ML
UROBILINOGEN URINE: NORMAL
VIT B12 SERPL-MCNC: 377 PG/ML
WBC # FLD AUTO: 7.11 K/UL

## 2022-04-08 NOTE — HISTORY OF PRESENT ILLNESS
[FreeTextEntry1] : Annual exam [de-identified] : 56-year-old female with hypothyroidism, pulmonary nodules, joint pains and abdominal mass presents for annual exam.  She was recently diagnosed with atypical cells on a skin lesion that [facility] melanocytes, and advised she needs a wider excision but they no longer take her insurance.  She is having a very hard time finding a dermatologic surgeon that takes her insurance, advised her to discuss further with her insurance company.\par \par Up-to-date with screenings

## 2022-04-08 NOTE — PHYSICAL EXAM
[Normal Female:] : bladder was normal on palpation [Normal] : normal gait, coordination grossly intact, no focal deficits [de-identified] : Deferred GYN; Denies pain, lumps and discharge [FreeTextEntry1] : Deferred GIGYN/ [de-identified] : No lymphadenopathy [de-identified] : Denies excessive thirst, urination, fatigue [de-identified] : No rash or skin lesion [de-identified] : Alert and Oriented x3.  Appropriate mood and affect

## 2022-04-08 NOTE — PLAN
General [FreeTextEntry1] : 56-year-old female as noted presents for annual exam, feels well, no complaints offered other than difficulty finding surgeon for abnormal skin lesion.  She will contact pharmacy and I gave her other names and numbers.\par \par Reviewed diet and stressed the importance of regular exercise\par \par Up-to-date with screenings\par \par Call next week to review labs

## 2022-04-08 NOTE — HEALTH RISK ASSESSMENT
[Very Good] : ~his/her~  mood as very good [Former] : Former [No] : No [No falls in past year] : Patient reported no falls in the past year [0] : 2) Feeling down, depressed, or hopeless: Not at all (0) [Patient reported mammogram was normal] : Patient reported mammogram was normal [Patient reported PAP Smear was normal] : Patient reported PAP Smear was normal [Patient declined bone density test] : Patient declined bone density test [Patient reported colonoscopy was normal] : Patient reported colonoscopy was normal [HIV test declined] : HIV test declined [Hepatitis C test declined] : Hepatitis C test declined [[id number]] : 0 [MammogramDate] : 12/20 [PapSmearDate] : 03/21 [ColonoscopyDate] : 10/16

## 2022-04-27 NOTE — DATA REVIEWED
[FreeTextEntry1] : I have reviewed medical records, including previous and most recent Labs, body imaging, consults and progress notes, pathology reports, ED records, found in [facility] and [facility].\par

## 2022-04-27 NOTE — ASSESSMENT
[FreeTextEntry1] : will have repeat CT on Friday.  Based upon results, will need treatment with prednisone + Tamoxifen.  She should follow up with me in 2-3 weeks, and Dr. Garay once results available.

## 2022-04-27 NOTE — HISTORY OF PRESENT ILLNESS
[FreeTextEntry1] : 57 yo female referred by Dr. Garay for evaluation and management of sclerosing mesenteritis.\par \par She had abdominal pain and went to the ER and was found to have an abdominal mass.  She had a biopsy by Dr. Granda, which showed mesenteric sclerosis.  Dr. Garay prescribed Prednisone 40 mg for almost a month.  She felt dizzy, had increased BP.  She will have repeat CT of her abdomen later this week.\par \par She has knee pain all the time.  She uses Aspercreme on the back of her legs with no benefit.   No benefit with Tylenol either.\par \par She has wrist pain and has sharp pains going into her hand.  She saw neurologist who told her she had carpal tunnel.  She sometimes wears the brace.\par \par She follows with Dr. Thomas for pulmonary nodules.\par \par She gets abdominal pain from certain meats, vegetables and dairy.\par No recent weight loss.\par No fevers.\par No diarrhea.  Occasional constipation-resolved with apple sauce.\par \par She quit smoking 16 years ago.\par No FH of autoimmune disease.\par \par + dry eyes and mouth\par + Raynauds\par No oral ulcers\par She has a histoyr of atopic dermatitis.\par + fatigue\par She takes a Xanax every  night to sleep.

## 2022-05-05 ENCOUNTER — NON-APPOINTMENT (OUTPATIENT)
Age: 57
End: 2022-05-05

## 2022-05-05 ENCOUNTER — APPOINTMENT (OUTPATIENT)
Dept: RHEUMATOLOGY | Facility: CLINIC | Age: 57
End: 2022-05-05
Payer: MEDICAID

## 2022-05-05 VITALS
HEIGHT: 63 IN | BODY MASS INDEX: 30.12 KG/M2 | WEIGHT: 170 LBS | SYSTOLIC BLOOD PRESSURE: 118 MMHG | DIASTOLIC BLOOD PRESSURE: 70 MMHG | HEART RATE: 64 BPM | OXYGEN SATURATION: 94 %

## 2022-05-05 DIAGNOSIS — M25.50 PAIN IN UNSPECIFIED JOINT: ICD-10-CM

## 2022-05-05 DIAGNOSIS — G89.29 PAIN IN UNSPECIFIED JOINT: ICD-10-CM

## 2022-05-05 PROCEDURE — 99215 OFFICE O/P EST HI 40 MIN: CPT | Mod: 25

## 2022-05-06 LAB
ALBUMIN SERPL ELPH-MCNC: 4.5 G/DL
ALP BLD-CCNC: 73 U/L
ALT SERPL-CCNC: 12 U/L
ANION GAP SERPL CALC-SCNC: 13 MMOL/L
AST SERPL-CCNC: 21 U/L
BASOPHILS # BLD AUTO: 0.07 K/UL
BASOPHILS NFR BLD AUTO: 1 %
BILIRUB SERPL-MCNC: 0.3 MG/DL
BUN SERPL-MCNC: 14 MG/DL
CALCIUM SERPL-MCNC: 9.2 MG/DL
CHLORIDE SERPL-SCNC: 100 MMOL/L
CO2 SERPL-SCNC: 25 MMOL/L
CREAT SERPL-MCNC: 0.81 MG/DL
CRP SERPL-MCNC: 13 MG/L
EGFR: 84 ML/MIN/1.73M2
EOSINOPHIL # BLD AUTO: 0.26 K/UL
EOSINOPHIL NFR BLD AUTO: 3.6 %
ERYTHROCYTE [SEDIMENTATION RATE] IN BLOOD BY WESTERGREN METHOD: 66 MM/HR
GLUCOSE SERPL-MCNC: 100 MG/DL
HCT VFR BLD CALC: 39.4 %
HGB BLD-MCNC: 12.2 G/DL
IMM GRANULOCYTES NFR BLD AUTO: 0.3 %
LYMPHOCYTES # BLD AUTO: 1.52 K/UL
LYMPHOCYTES NFR BLD AUTO: 21.1 %
MAN DIFF?: NORMAL
MCHC RBC-ENTMCNC: 28.1 PG
MCHC RBC-ENTMCNC: 31 GM/DL
MCV RBC AUTO: 90.8 FL
MONOCYTES # BLD AUTO: 0.56 K/UL
MONOCYTES NFR BLD AUTO: 7.8 %
NEUTROPHILS # BLD AUTO: 4.77 K/UL
NEUTROPHILS NFR BLD AUTO: 66.2 %
PLATELET # BLD AUTO: 307 K/UL
POTASSIUM SERPL-SCNC: 4.6 MMOL/L
PROT SERPL-MCNC: 7.2 G/DL
RBC # BLD: 4.34 M/UL
RBC # FLD: 14.3 %
SODIUM SERPL-SCNC: 139 MMOL/L
WBC # FLD AUTO: 7.2 K/UL

## 2022-05-11 LAB
TPMT ENZYME INTERPRETATION: NORMAL
TPMT ENZYME METHODOLOGY: NORMAL
TPMT ENZYME: 14.8

## 2022-06-02 ENCOUNTER — RX RENEWAL (OUTPATIENT)
Age: 57
End: 2022-06-02

## 2022-06-06 PROBLEM — M25.50 CHRONIC PAIN OF MULTIPLE JOINTS: Status: ACTIVE | Noted: 2022-03-14

## 2022-06-06 RX ORDER — MAGNESIUM 200 MG
1000 TABLET ORAL
Qty: 30 | Refills: 0 | Status: ACTIVE | COMMUNITY
Start: 2022-06-06

## 2022-06-06 NOTE — HISTORY OF PRESENT ILLNESS
[FreeTextEntry1] : c/o pain in her hands and back.  Sometimes her elbows.\par \par She gets rashes on her forehead and upper back.  + itchiness Skin biopsy was normal.  She was prescribed a cream, but it only helps for a minute or two.\par \par She took Prednisone 40 mg daily in October for 1 month.

## 2022-06-07 RX ORDER — LEVOTHYROXINE SODIUM 75 UG/1
75 TABLET ORAL
Qty: 90 | Refills: 1 | Status: DISCONTINUED | COMMUNITY
Start: 2022-05-09 | End: 2022-06-07

## 2022-06-17 ENCOUNTER — APPOINTMENT (OUTPATIENT)
Dept: FAMILY MEDICINE | Facility: CLINIC | Age: 57
End: 2022-06-17
Payer: MEDICAID

## 2022-06-17 VITALS
SYSTOLIC BLOOD PRESSURE: 108 MMHG | DIASTOLIC BLOOD PRESSURE: 64 MMHG | BODY MASS INDEX: 29.95 KG/M2 | RESPIRATION RATE: 16 BRPM | TEMPERATURE: 96.5 F | HEIGHT: 63 IN | HEART RATE: 82 BPM | OXYGEN SATURATION: 94 % | WEIGHT: 169 LBS

## 2022-06-17 PROCEDURE — 99214 OFFICE O/P EST MOD 30 MIN: CPT

## 2022-06-17 RX ORDER — CICLOPIROX 10 MG/.96ML
1 SHAMPOO TOPICAL
Qty: 1 | Refills: 1 | Status: COMPLETED | COMMUNITY
Start: 2022-04-07 | End: 2022-06-17

## 2022-06-17 RX ORDER — AZATHIOPRINE 50 1/1
50 TABLET ORAL DAILY
Qty: 30 | Refills: 0 | Status: COMPLETED | COMMUNITY
Start: 2022-05-05 | End: 2022-06-17

## 2022-06-17 RX ORDER — ALBUTEROL SULFATE 90 UG/1
108 (90 BASE) INHALANT RESPIRATORY (INHALATION)
Qty: 1 | Refills: 4 | Status: COMPLETED | COMMUNITY
Start: 2021-09-14 | End: 2022-06-17

## 2022-06-20 NOTE — ASSESSMENT
[FreeTextEntry1] : 56 year old female presenting to establish care and back pain. \par Reviewed patient's charts/notes/labs/imagings\par back pain has improved since stopping azathioprine.\par patient recommended to f/u with rheumatologist. \par Patient has a history of thyroid nodule, last labs were normal, needs repeat US - ordered \par atorvastatin for HLD refilled

## 2022-06-20 NOTE — HISTORY OF PRESENT ILLNESS
[FreeTextEntry8] : 56 year old female with pmh of hypothyroidism, HLD, sclerosing mesenteric fibrosis, vitamin B12/D deficienct presenting to establish care and for acute back pain. Patient reports she was recently prescribed azathioprine which was causing back pain and making her sick; she stopped the medication which has improved her symptoms. She is otherwise doing well and needs refills for medicaitons.

## 2022-07-01 ENCOUNTER — APPOINTMENT (OUTPATIENT)
Dept: RHEUMATOLOGY | Facility: CLINIC | Age: 57
End: 2022-07-01

## 2022-07-01 DIAGNOSIS — E53.8 DEFICIENCY OF OTHER SPECIFIED B GROUP VITAMINS: ICD-10-CM

## 2022-07-01 DIAGNOSIS — R53.83 OTHER FATIGUE: ICD-10-CM

## 2022-07-01 DIAGNOSIS — R19.00 INTRA-ABDOMINAL AND PELVIC SWELLING, MASS AND LUMP, UNSPECIFIED SITE: ICD-10-CM

## 2022-07-01 DIAGNOSIS — R79.82 ELEVATED C-REACTIVE PROTEIN (CRP): ICD-10-CM

## 2022-07-01 PROCEDURE — 99214 OFFICE O/P EST MOD 30 MIN: CPT | Mod: 95

## 2022-07-11 ENCOUNTER — APPOINTMENT (OUTPATIENT)
Dept: HEMATOLOGY ONCOLOGY | Facility: CLINIC | Age: 57
End: 2022-07-11

## 2022-07-22 ENCOUNTER — APPOINTMENT (OUTPATIENT)
Dept: FAMILY MEDICINE | Facility: CLINIC | Age: 57
End: 2022-07-22

## 2022-07-22 VITALS
HEART RATE: 89 BPM | SYSTOLIC BLOOD PRESSURE: 110 MMHG | RESPIRATION RATE: 16 BRPM | BODY MASS INDEX: 29.05 KG/M2 | OXYGEN SATURATION: 95 % | TEMPERATURE: 98.3 F | WEIGHT: 164 LBS | DIASTOLIC BLOOD PRESSURE: 66 MMHG

## 2022-07-22 DIAGNOSIS — N39.0 URINARY TRACT INFECTION, SITE NOT SPECIFIED: ICD-10-CM

## 2022-07-22 LAB
BILIRUB UR QL STRIP: NORMAL
CLARITY UR: CLEAR
COLLECTION METHOD: NORMAL
GLUCOSE UR-MCNC: NORMAL
HCG UR QL: 0.2 EU/DL
HGB UR QL STRIP.AUTO: NORMAL
KETONES UR-MCNC: NORMAL
LEUKOCYTE ESTERASE UR QL STRIP: NORMAL
NITRITE UR QL STRIP: NORMAL
PH UR STRIP: 5.5
PROT UR STRIP-MCNC: NORMAL
SP GR UR STRIP: 1.02

## 2022-07-22 PROCEDURE — 99214 OFFICE O/P EST MOD 30 MIN: CPT

## 2022-07-22 PROCEDURE — 81003 URINALYSIS AUTO W/O SCOPE: CPT | Mod: QW

## 2022-07-30 PROBLEM — N39.0 UTI (URINARY TRACT INFECTION): Status: RESOLVED | Noted: 2022-07-22 | Resolved: 2022-08-21

## 2022-07-30 NOTE — HISTORY OF PRESENT ILLNESS
[FreeTextEntry8] : 56 year old female with pmh of sclerosing mesenteritis presenting for follow up. Patient reports she was recently seen by Dr. Fonseca and referred to hematology for treatment with tamoxifen and is wondering what to do Patient also complaints of UTI symptoms.

## 2022-07-30 NOTE — ASSESSMENT
[FreeTextEntry1] : 56 year old female presented with questions about sclerosing mesenteritis. \par Reviewed rheumatology and hematology notes.\par Discussed with patient regarding the treatment options that were presented to her by Dr. Fonseca. Recommended if she's unsure she can go to GI/rheumatology/oncology for 2nd opinion. \par Patient had multiple questions that were answered to satisfaction.\par She agreed to go See Dr. Garay. \par urine dip positive for leukoesterase, given symptoms will treat with macrobid.

## 2022-08-03 ENCOUNTER — RESULT REVIEW (OUTPATIENT)
Age: 57
End: 2022-08-03

## 2022-08-06 PROBLEM — R53.83 FATIGUE: Status: ACTIVE | Noted: 2021-05-07

## 2022-08-06 PROBLEM — E53.8 VITAMIN B 12 DEFICIENCY: Status: ACTIVE | Noted: 2022-06-06

## 2022-08-06 PROBLEM — R19.00 RETROPERITONEAL MASS: Status: ACTIVE | Noted: 2021-07-12

## 2022-08-06 PROBLEM — R79.82 ELEVATED C-REACTIVE PROTEIN (CRP): Status: ACTIVE | Noted: 2022-06-06

## 2022-08-06 NOTE — HISTORY OF PRESENT ILLNESS
[Home] : at home, [unfilled] , at the time of the visit. [Medical Office: ([facility])___] : at the medical office located in  [Verbal consent obtained from patient] : the patient, [unfilled] [FreeTextEntry1] : She took Azathioprine for a month, and then at the end of the prescription, her face swelled up.  She stopped it and a week letter, the swelling resolved.  No tingling in mouth, difficulty breathing.\par \par She is taking Prednisone 40 mg since last visit 5/5/22.\par \par She has pain in her hands.  She has difficulty opening lids, jars.\par She has chronic low back pain.\par \par No fevers.  No rash.

## 2022-08-19 ENCOUNTER — APPOINTMENT (OUTPATIENT)
Dept: ENDOCRINOLOGY | Facility: CLINIC | Age: 57
End: 2022-08-19

## 2022-08-19 VITALS
HEIGHT: 63 IN | HEART RATE: 78 BPM | BODY MASS INDEX: 29.06 KG/M2 | OXYGEN SATURATION: 96 % | WEIGHT: 164 LBS | SYSTOLIC BLOOD PRESSURE: 102 MMHG | DIASTOLIC BLOOD PRESSURE: 60 MMHG

## 2022-08-19 PROCEDURE — 99214 OFFICE O/P EST MOD 30 MIN: CPT | Mod: 25

## 2022-08-19 PROCEDURE — 36415 COLL VENOUS BLD VENIPUNCTURE: CPT

## 2022-09-09 ENCOUNTER — APPOINTMENT (OUTPATIENT)
Dept: PAIN MANAGEMENT | Facility: CLINIC | Age: 57
End: 2022-09-09

## 2022-09-09 VITALS
DIASTOLIC BLOOD PRESSURE: 67 MMHG | WEIGHT: 164 LBS | HEIGHT: 63 IN | BODY MASS INDEX: 29.06 KG/M2 | TEMPERATURE: 98 F | SYSTOLIC BLOOD PRESSURE: 103 MMHG

## 2022-09-09 PROCEDURE — 99244 OFF/OP CNSLTJ NEW/EST MOD 40: CPT

## 2022-09-09 RX ORDER — NITROFURANTOIN (MONOHYDRATE/MACROCRYSTALS) 25; 75 MG/1; MG/1
100 CAPSULE ORAL
Qty: 10 | Refills: 0 | Status: COMPLETED | COMMUNITY
Start: 2022-07-22 | End: 2022-09-09

## 2022-09-09 NOTE — REASON FOR VISIT
[Initial Visit] : an initial pain management visit [FreeTextEntry2] : Referred by Dr. Fonseca (rheumatology)

## 2022-09-09 NOTE — PHYSICAL EXAM
[Normal muscle bulk without asymmetry] : normal muscle bulk without asymmetry [Facet Tenderness] : facet tenderness [Spine: Flexion to ___ degrees, without pain] : spine: flexion to [unfilled] degrees, without pain [] : Motor: [NL] : normal and symmetric bilaterally [Normal] : Normal affect [de-identified] : Constitutional: Normal, well developed, no acute distress\par Eyes: Symmetric, External structures \par Oropharynx: Lips normal, symmetric, no external lesions appreciated\par Respiratory: Non-labored breathing, no audible wheezes\par Cardiac: Pulse palpated, no tachycardia\par Vascular: No cyanosis appreciated, no edema in bilateral lower extremities\par GI: + distention no jaundice appreciated\par Neurovascular: CN2-12 grossly intact, Alert and oriented\par MSK: Normal muscle bulk, 5/5 Motor strength B/L in LE\par \par

## 2022-09-09 NOTE — HISTORY OF PRESENT ILLNESS
[8] : 3. What number best describes how, during the past week, pain has interfered with your general activity? 8/10 pain [___ yrs] : [unfilled] year(s) ago [Constant] : constant [9] : a minimum pain level of 9/10 [10] : a maximum pain level of 10/10 [Aching] : aching [Throbbing] : throbbing [Bending] : bending [Heat] : heat [Ice] : ice [FreeTextEntry1] : HPI\par \par  \par \par Ms. JAG TALBERT is a 56 year F with sclerosing mesenteritis (off steroids) referred by rheumatologist for pain management. Pain to periumbilical area. In addition c/o of axial lower back and bilateral groin pain x 10 yrs. Worsening throughout the day. Difficulty to walk and complete ADL's. Denies any additional weakness, numbness, bowel/bladder dysfunction.\par \par \par \par Previous and current pain medications/doses/effects: Aspercreme, Gabapentin, Tylenol\par \par  \par \par Previous Pain Treatments: Physcial Therapy ~2 yrs ago\par \par  \par \par Previous Pain Injections: None\par \par  \par \par Previous Diagnostic Studies/Images: \par \par MRI of lumbar spine without contrast 4/5/2022\par \par CLINICAL INFORMATION:\par Lumber radiculopathy\par TECHNIQUE:\par T1 and T2-weighted sagittal and axial images of lumbar spine are obtained.\par COMPARISON:\par MRI lumbar spine performed on 03/14/2014.\par FINDINGS:\par The vertebral height, bone marrow signal and bony alignment of lumbar spine are unremarkable\par Desiccation of intervertebral disc spaces is noted throughout the lumber spine due to degenerative\par disc changes. The conus is visualized at L1 level with normal morphology and signal intensity.\par L2-3 and L3-4 levels demonstrate mild bulging annulus indenting the ventral dural sac without\par significant spinal stenosis.\par At L4-5 level, there is broad-based central disc herniation indenting the ventral dural sac without\par significant spinal stenosis.\par Other levels of lumbar spine demonstrate no significant disk herniation or spinal stenosis.\par Impression:\par Broad-based mild central disc herniation is noted at L4-5 level without significant spinal stenosis.\par When compared to prior MRI exarination lumbar spine performed on 03/14/2014, no significant\par interval change is noted.\par Electronically Signed By: Art Angeles on April 05, 2022 08:34 PM\par \par \par  \par \par \par  [FreeTextEntry2] : 22 [FreeTextEntry7] : Back Pain  [FreeTextEntry4] : PT

## 2022-09-09 NOTE — CONSULT LETTER
[Dear  ___] : Dear  [unfilled], [Consult Letter:] : I had the pleasure of evaluating your patient, [unfilled]. [Please see my note below.] : Please see my note below. [Consult Closing:] : Thank you very much for allowing me to participate in the care of this patient.  If you have any questions, please do not hesitate to contact me. [Sincerely,] : Sincerely, [FreeTextEntry3] : \par Jackelyn Hernandez DO, MBA\par Director, Pain Management Center\par  of Anesthesiology\par [facility] [employer] [facility]\par \par \par

## 2022-09-09 NOTE — ASSESSMENT
[FreeTextEntry1] : >> Imaging and Other Studies\par \par I personally reviewed the relevant imaging.  Discussed and explained to patient the likely source of pathology and pain.  Questions answered. MRI \par \par >> Therapy and Other Modalities\par \par PT\par \par >> Medications\par \par acetaminophen 650mg q8h prn pain (caution <3g daily)\par  \par >> Interventions\par \par Significant component of axial back pain secondary to lumbar spondylosis and facet arthropathy demonstrated on MRI LS.  Pain refractory to conservative treatments.  Will schedule BILATERAL L4-sacral ala diagnostic medial branch block (2 joints, 3 nerves on each side) r/b/a discussed\par \par May consider radiofreqency ablation\par \par >> Consults\par \par fu with heme onc\par \par referral to GI - provided\par \par >> Discussion of Risks/Benefits/Alternatives\par \par 	>Regarding any scheduled procedures:\par \par I have discussed in detail with the patient that any interventional pain procedure is associated with potential risks.  The procedure may include an injection of steroids and potentially other medications (local anesthetic and normal saline) into the epidural space or surrounding tissue of the spine.  There are significant risks of this procedure which include and are not limited to infection, bleeding, worsening pain, dural puncture leading to postdural puncture headache, nerve damage, spinal cord injury, paralysis, stroke, and death.  \par \par There is a chance that the procedure does not improve their pain.  \par \par There are risks associated with the steroid being absorbed into the body systemically.  These include dysphoria, difficulty sleeping, mood swings and personality changes.  Premenopausal women may notice an irregularity in her menstrual cycle for 2-3 months following the injection.  Steroids can specifically affect patients with hypertension, diabetes, and peptic ulcers.  The procedure may cause a temporary increase in blood pressure and blood pressure, and may adversely affect a peptic ulcer.  Other, more rare complications, include avascular necrosis of joints, glaucoma and worsening of osteoporosis. \par \par I have discussed the risks of the procedure at length with the patient, and the potential benefits of pain relief.  I have offered alternatives to the procedure.  All questions were answered.  \par \par The patient expressed understanding and wishes to proceed with the procedure.\par \par 	>Regarding COVID19 Pandemic: \par \par Any planned interventional pain procedure are scheduled because further delay may cause harm or negative outcome to patient.  The goal in performing this procedure is to avoid deterioration of function, emergency room visits (which increases exposure) and reliance on opioids.  \par \par r/b/a discussed with patient, lack of evidence to conclusively determine whether pain management procedures have any positive or negative impact on the possibility of jojo the virus and/or development of any sequelae. \par \par Patient counselled regarding timing steroid based intervention 2 weeks before or after COVID-19 vaccine administration to avoid any interaction or affect on efficacy of vaccination\par \par Patient demonstrates understanding\par \par Informed patient that risks associated with the COVID-19 infection.  Informed patient steps taken to limit the risks.  We are implementing safety precautions and following protocols consistent with the CDC and state recommendations. All patients and staff will be checked for fever or signs of illness upon entry to the facility. We will limit our steroid dose to the lowest effective therapeutic dose or in some cases steroids will not be injected at all. \par \par Patient agrees to proceed\par \par >> Conclusion\par \par The above diagnosis and treatment plan is medically reasonable and necessary based on the patient encounter \par There were no barriers to communication.\par Informed patient that I would be available for any additional questions.\par Patient was instructed to call with any worsening symptoms including severe pain, new numbness/weakness, or changes in the bowel/bladder function. \par Discussed role of nsaids in pain management and all relevant risks, if patient is continuing to require after 4 weeks the patient should f/u for alternative treatment. \par Instructed patient to maintain pain diary to monitor pain level, mobility, and function.\par \par The referring provider was informed of the above diagnosis and treatment plan.\par

## 2022-09-16 ENCOUNTER — APPOINTMENT (OUTPATIENT)
Dept: OBGYN | Facility: CLINIC | Age: 57
End: 2022-09-16

## 2022-09-16 VITALS
BODY MASS INDEX: 28.7 KG/M2 | SYSTOLIC BLOOD PRESSURE: 134 MMHG | DIASTOLIC BLOOD PRESSURE: 78 MMHG | WEIGHT: 162 LBS | HEIGHT: 63 IN

## 2022-09-16 DIAGNOSIS — Z01.419 ENCOUNTER FOR GYNECOLOGICAL EXAMINATION (GENERAL) (ROUTINE) W/OUT ABNORMAL FINDINGS: ICD-10-CM

## 2022-09-16 DIAGNOSIS — Z78.0 ASYMPTOMATIC MENOPAUSAL STATE: ICD-10-CM

## 2022-09-16 DIAGNOSIS — Z09 ENCOUNTER FOR FOLLOW-UP EXAMINATION AFTER COMPLETED TREATMENT FOR CONDITIONS OTHER THAN MALIGNANT NEOPLASM: ICD-10-CM

## 2022-09-16 DIAGNOSIS — Z87.898 PERSONAL HISTORY OF OTHER SPECIFIED CONDITIONS: ICD-10-CM

## 2022-09-16 PROCEDURE — 99396 PREV VISIT EST AGE 40-64: CPT

## 2022-09-16 RX ORDER — PREDNISONE 20 MG/1
20 TABLET ORAL DAILY
Qty: 60 | Refills: 3 | Status: DISCONTINUED | COMMUNITY
Start: 2022-05-05 | End: 2022-09-16

## 2022-09-16 RX ORDER — CICLOPIROX OLAMINE 7.7 MG/G
0.77 CREAM TOPICAL TWICE DAILY
Qty: 1 | Refills: 1 | Status: DISCONTINUED | COMMUNITY
Start: 2022-04-07 | End: 2022-09-16

## 2022-09-16 NOTE — PHYSICAL EXAM
[Chaperone Declined] : Patient declined chaperone [Appropriately responsive] : appropriately responsive [Alert] : alert [No Acute Distress] : no acute distress [No Lymphadenopathy] : no lymphadenopathy [Regular Rate Rhythm] : regular rate rhythm [No Murmurs] : no murmurs [Clear to Auscultation B/L] : clear to auscultation bilaterally [Soft] : soft [Non-tender] : non-tender [Non-distended] : non-distended [No HSM] : No HSM [No Lesions] : no lesions [No Mass] : no mass [Oriented x3] : oriented x3 [Examination Of The Breasts] : a normal appearance [No Discharge] : no discharge [No Masses] : no breast masses were palpable [Labia Majora] : normal [Labia Minora] : normal [Atrophy] : atrophy [Dry Mucosa] : dry mucosa [Normal] : normal [Uterine Adnexae] : normal [Tenderness] : nontender [Enlarged ___ wks] : not enlarged

## 2022-09-27 NOTE — PHYSICAL EXAM
[Fully active, able to carry on all pre-disease performance without restriction] : Status 0 - Fully active, able to carry on all pre-disease performance without restriction [Normal] : affect appropriate Implemented All Universal Safety Interventions:  [place] to call system. Call bell, personal items and telephone within reach. Instruct patient to call for assistance. Room bathroom lighting operational. Non-slip footwear when patient is off stretcher. Physically safe environment: no spills, clutter or unnecessary equipment. Stretcher in lowest position, wheels locked, appropriate side rails in place.

## 2022-09-29 ENCOUNTER — APPOINTMENT (OUTPATIENT)
Dept: PAIN MANAGEMENT | Facility: CLINIC | Age: 57
End: 2022-09-29

## 2022-09-29 VITALS
DIASTOLIC BLOOD PRESSURE: 79 MMHG | BODY MASS INDEX: 29.06 KG/M2 | HEIGHT: 63 IN | WEIGHT: 164 LBS | RESPIRATION RATE: 16 BRPM | OXYGEN SATURATION: 99 % | SYSTOLIC BLOOD PRESSURE: 122 MMHG | HEART RATE: 67 BPM

## 2022-09-29 PROCEDURE — 64493 INJ PARAVERT F JNT L/S 1 LEV: CPT | Mod: 50

## 2022-09-29 PROCEDURE — 64494 INJ PARAVERT F JNT L/S 2 LEV: CPT | Mod: 50

## 2022-09-29 NOTE — PROCEDURE
[FreeTextEntry1] : LUMBAR MEDIAL BRANCH BLOCK BILATERAL \par \par The patient was placed in the prone position on the fluoroscopic table with a pillow under the abdomen.  Routine monitors were applied.  The back was draped in the usual sterile fashion and sterile technique was adhered to throughout the entire procedure.\par \par The [LEFT] L4-sacral ala  levels were identified under fluoroscopic guidance.  The vertebral body end plates were aligned and the junction of the superior articular process and the base of the transverse process was brought into view using an oblique angulation of the C-arm.  The skin and subcutaneous tissues were infiltrated with 1% Lidocaine.\par \par At all the levels except for the lumbosacral junction, a 25-gauge 3.5" spinal needle was inserted at the angle between the superior articular process and the base of the transverse process (after local anesthesia).  Oblique angulation was used to guide the needle into position.  The L5 median branch was identified at the lumbosacral junction and a 25-gauge 3.5" was guided fluoroscopically using AP view to the [LEFT ]lumbosacral junction.  1cc of 0.25% Bupivacaine with 1mg kenalog was injected at each level. \par \par \par The [RIGHT] L4-sacral ala  levels were identified under fluoroscopic guidance.  The vertebral body end plates were aligned and the junction of the superior articular process and the base of the transverse process was brought into view using an oblique angulation of the C-arm.  The skin and subcutaneous tissues were infiltrated with 1% Lidocaine.\par \par At all the levels except for the lumbosacral junction, a 25-gauge 3.5" spinal needle was inserted at the angle between the superior articular process and the base of the transverse process (after local anesthesia).  Oblique angulation was used to guide the needle into position.  The L5 median branch was identified at the lumbosacral junction and a 25-gauge 3.5" was guided fluoroscopically using AP view to the [RIGHT ]lumbosacral junction.  1cc of 0.25% Bupivacaine with 1mg kenalog was injected at each level. \par \par The patient tolerated the procedure well.  There was no neurological deficit post procedure.  The patient went to recovery room in stable condition.  Discharge instructions were given to the patient.\par

## 2022-09-30 ENCOUNTER — RESULT REVIEW (OUTPATIENT)
Age: 57
End: 2022-09-30

## 2022-09-30 ENCOUNTER — APPOINTMENT (OUTPATIENT)
Dept: PAIN MANAGEMENT | Facility: CLINIC | Age: 57
End: 2022-09-30

## 2022-09-30 ENCOUNTER — APPOINTMENT (OUTPATIENT)
Dept: HEMATOLOGY ONCOLOGY | Facility: CLINIC | Age: 57
End: 2022-09-30

## 2022-09-30 VITALS
SYSTOLIC BLOOD PRESSURE: 114 MMHG | BODY MASS INDEX: 29.14 KG/M2 | HEART RATE: 59 BPM | OXYGEN SATURATION: 97 % | HEIGHT: 63 IN | DIASTOLIC BLOOD PRESSURE: 55 MMHG | RESPIRATION RATE: 16 BRPM | WEIGHT: 164.44 LBS | TEMPERATURE: 97.1 F

## 2022-09-30 DIAGNOSIS — R10.9 UNSPECIFIED ABDOMINAL PAIN: ICD-10-CM

## 2022-09-30 PROCEDURE — 99214 OFFICE O/P EST MOD 30 MIN: CPT | Mod: 25

## 2022-09-30 PROCEDURE — 36415 COLL VENOUS BLD VENIPUNCTURE: CPT

## 2022-09-30 PROCEDURE — 99213 OFFICE O/P EST LOW 20 MIN: CPT | Mod: 95

## 2022-09-30 NOTE — HISTORY OF PRESENT ILLNESS
[___ yrs] : [unfilled] year(s) ago [Constant] : constant [9] : a minimum pain level of 9/10 [10] : a maximum pain level of 10/10 [Aching] : aching [Throbbing] : throbbing [Bending] : bending [Heat] : heat [Ice] : ice [FreeTextEntry1] : Interval Note:\par \par Since last visit the pain is is not improvement sp bilateral L4-sacral ala medial branch block without significant improvement.  Pain is so bad that patient finds it difficult to perform adls and ambulate.  She is pending evaluation with Dr. Wolf. Denies any additional weakness, numbness, bowel/bladder dysfunction.  \par \par \par \par HPI\par \par  \par \par Ms. JAG TALBERT is a 56 year F with sclerosing mesenteritis (off steroids) referred by rheumatologist for pain management. Pain to periumbilical area. In addition c/o of axial lower back and bilateral groin pain x 10 yrs. Worsening throughout the day. Difficulty to walk and complete ADL's. Denies any additional weakness, numbness, bowel/bladder dysfunction.\par \par \par \par Previous and current pain medications/doses/effects: Aspercreme, Gabapentin, Tylenol\par \par  \par \par Previous Pain Treatments: Physcial Therapy ~2 yrs ago\par \par  \par \par Previous Pain Injections: \par \par bilateral L4-sacral ala medial branch block without significant improvement\par \par  \par \par Previous Diagnostic Studies/Images: \par \par MRI of lumbar spine without contrast 4/5/2022\par \par CLINICAL INFORMATION:\par Lumber radiculopathy\par TECHNIQUE:\par T1 and T2-weighted sagittal and axial images of lumbar spine are obtained.\par COMPARISON:\par MRI lumbar spine performed on 03/14/2014.\par FINDINGS:\par The vertebral height, bone marrow signal and bony alignment of lumbar spine are unremarkable\par Desiccation of intervertebral disc spaces is noted throughout the lumber spine due to degenerative\par disc changes. The conus is visualized at L1 level with normal morphology and signal intensity.\par L2-3 and L3-4 levels demonstrate mild bulging annulus indenting the ventral dural sac without\par significant spinal stenosis.\par At L4-5 level, there is broad-based central disc herniation indenting the ventral dural sac without\par significant spinal stenosis.\par Other levels of lumbar spine demonstrate no significant disk herniation or spinal stenosis.\par Impression:\par Broad-based mild central disc herniation is noted at L4-5 level without significant spinal stenosis.\par When compared to prior MRI exarination lumbar spine performed on 03/14/2014, no significant\par interval change is noted.\par Electronically Signed By: Art Angeles on April 05, 2022 08:34 PM\par \par \par  \par \par \par  [Home] : at home, [unfilled] , at the time of the visit. [Medical Office: ([facility])___] : at the medical office located in  [FreeTextEntry7] : Back Pain  [FreeTextEntry4] : PT

## 2022-09-30 NOTE — HISTORY OF PRESENT ILLNESS
[de-identified] : 55 year old female presents today for initial consultation of  a questionable retroperitoneal mass and lung nodules.  She presented to ER in July with complaints of abdominal pain x 2 weeks, she was found to have infiltration of the retroperitoneal mesentery, extensive shotty adenopathy, 4cm soft tissue mass, and was admitted for further management to rule out malignancy.  General Surgeon Dr. Butler consulted and recommended IR guided biopsy.  Outpatient Pulmonologist Dr. Knox consulted.  Outpatient PET scan which showed no uptake in lungs or abdomen and has repeat CT Chest in September 2021.  No IR or surgical intervention recommended at this time.  \par \par PET SCAN 3/2021- No evidence of hypermetabolic focus in the lung parenchyma. Bilateral pulmonary opacities- F/u 6 month diagnostic CT chest is advised.  No evidence of hypermetabolic activity in the mediastinal and cervical nodes.  Diffuse hypermetabolic activity in the thyroid gland can be secondary to thyroiditis.  \par \par CT chest 9/10/2021- Multiple diverticuli in lower descending colon and sigmoid colon, without evidence of acute diverticulitis.  Multiple calculi in normal sized gallbladder, without additional CT signs of acute cholecystitis no evidence of biliary duct dilatation.  No change in hazy increased density in mesentery, multiple small mesenteric lymph nodes, or in\par lobulated left mesenteric mass that is partially calcified and measures approximately 4 x 2 x 3 cm, since prior CT of 7/8/2021.  No other evidence of acute abdominal or pelvic pathology.\par \par Colonoscopy done- Aug 26, 2021- pt reports WNL with Dr. Nunez\par \par Family Hx- MGM lung cancer (smoker) PGM metastatic breast cancer postmenopausal, father with colon cancer- alive, paternal cousin with colon cancer- alive \par Smoking Hx- smoked for 29 years, quit 16 years ago, smoked 2 PPD [de-identified] : Patient is seen today for follow up\par She was last seen by Dr Garay in Feb 2022 and Dr Fonseca (1/2022) and subsequently decided to stop following\par \par Abdominal pain happens intermittently - mostly around the umbilical area- apparently gets better with urination. No pain at present\par She has not been on prednisone for a while\par \par Quit smoking 16 years ago. On Lung cancer screening program

## 2022-09-30 NOTE — ASSESSMENT
[FreeTextEntry1] : >> Imaging and Other Studies\par \par I personally reviewed the relevant imaging.  Discussed and explained to patient the likely source of pathology and pain.  Questions answered. MRI \par \par >> Therapy and Other Modalities\par \par PT\par \par >> Medications\par \par acetaminophen 650mg q8h prn pain (caution <3g daily)\par  \par >> Interventions\par \par Significant component of axial back pain secondary to lumbar spondylosis and facet arthropathy demonstrated on MRI LS.  Pain refractory to conservative treatments.  sp BILATERAL L4-sacral ala diagnostic medial branch block (2 joints, 3 nerves on each side) \par \par Significant component of back and leg pain likely secondary to lumbar spinal stenosis demonstrated on MRI LS.  Will schedule L4-5 interlaminar epidural steroid injection r/b/a discussed\par \par \par \par \par >> Consults\par \par fu with heme onc\par \par referral to GI - provided\par \par >> Discussion of Risks/Benefits/Alternatives\par \par 	>Regarding any scheduled procedures:\par \par I have discussed in detail with the patient that any interventional pain procedure is associated with potential risks.  The procedure may include an injection of steroids and potentially other medications (local anesthetic and normal saline) into the epidural space or surrounding tissue of the spine.  There are significant risks of this procedure which include and are not limited to infection, bleeding, worsening pain, dural puncture leading to postdural puncture headache, nerve damage, spinal cord injury, paralysis, stroke, and death.  \par \par There is a chance that the procedure does not improve their pain.  \par \par There are risks associated with the steroid being absorbed into the body systemically.  These include dysphoria, difficulty sleeping, mood swings and personality changes.  Premenopausal women may notice an irregularity in her menstrual cycle for 2-3 months following the injection.  Steroids can specifically affect patients with hypertension, diabetes, and peptic ulcers.  The procedure may cause a temporary increase in blood pressure and blood pressure, and may adversely affect a peptic ulcer.  Other, more rare complications, include avascular necrosis of joints, glaucoma and worsening of osteoporosis. \par \par I have discussed the risks of the procedure at length with the patient, and the potential benefits of pain relief.  I have offered alternatives to the procedure.  All questions were answered.  \par \par The patient expressed understanding and wishes to proceed with the procedure.\par \par 	>Regarding COVID19 Pandemic: \par \par Any planned interventional pain procedure are scheduled because further delay may cause harm or negative outcome to patient.  The goal in performing this procedure is to avoid deterioration of function, emergency room visits (which increases exposure) and reliance on opioids.  \par \par r/b/a discussed with patient, lack of evidence to conclusively determine whether pain management procedures have any positive or negative impact on the possibility of jojo the virus and/or development of any sequelae. \par \par Patient counselled regarding timing steroid based intervention 2 weeks before or after COVID-19 vaccine administration to avoid any interaction or affect on efficacy of vaccination\par \par Patient demonstrates understanding\par \par Informed patient that risks associated with the COVID-19 infection.  Informed patient steps taken to limit the risks.  We are implementing safety precautions and following protocols consistent with the CDC and state recommendations. All patients and staff will be checked for fever or signs of illness upon entry to the facility. We will limit our steroid dose to the lowest effective therapeutic dose or in some cases steroids will not be injected at all. \par \par Patient agrees to proceed\par \par >> Conclusion\par \par The above diagnosis and treatment plan is medically reasonable and necessary based on the patient encounter \par There were no barriers to communication.\par Informed patient that I would be available for any additional questions.\par Patient was instructed to call with any worsening symptoms including severe pain, new numbness/weakness, or changes in the bowel/bladder function. \par Discussed role of nsaids in pain management and all relevant risks, if patient is continuing to require after 4 weeks the patient should f/u for alternative treatment. \par Instructed patient to maintain pain diary to monitor pain level, mobility, and function.\par

## 2022-09-30 NOTE — PHYSICAL EXAM
[de-identified] : \par Constitutional: Normal, well developed, no acute distress on audio/video examination\par Eyes: Symmetric, External structures on video examination\par ENT: Lips, mucosa and tongue normal on video examination\par Oropharynx: Lips normal, symmetric, no external lesions appreciated appreciated on video examination\par Respiratory: Non-labored breathing, no audible wheezes appreciated on audio/video examination\par Vascular: No cyanosis appreciated or edema appreciated on video examination\par GI:  no jaundice appreciated on video examination\par Neurovascular: CN grossly intact on video/audio examination, alert\par MSK: Normal muscle bulk on video examination\par

## 2022-09-30 NOTE — ASSESSMENT
[FreeTextEntry1] : Sclerosing Mesenteritis\par Biopsy proven \par INtermittent abdominal pain\par Has received prednisone periodically\par Was briefly lost to follow up\par \par Seen today for follow up\par No abdominal pain\par No tenderness on palpation\par Labs ordered, drawn in the office, reviewed, analyzed and discussed\par ESR slightly better\par Explained that for recurrent pain we can consider tamoxifen+prednisone combination \par For lack of response- will consider pentoxifylline or azathioprine\par \par Social Hx\par Lives with her  in [place]\par Used to work in [employer], not current employed\par \par Patient had multiple questions which were answered to satisfaction\par \par Follow up in 3 months or sooner if needed\par CBC, CMP, ESR, CRP

## 2022-10-02 LAB
T4 FREE SERPL-MCNC: 1.4 NG/DL
TSH SERPL-ACNC: 0.61 UIU/ML

## 2022-10-02 NOTE — HISTORY OF PRESENT ILLNESS
[FreeTextEntry1] : Ms. JAG TALBERT is a 53 year old female\par coming for f/u hypothyroidism\par        used to be on Levothyroxine 75mcg same dose for about a month dose increased from 88mcg RYAN Synthroid by her PCP by her PCP Dr Preet Gutierrez [place]\par        11/7/2016 was swiched from Synthroid to Sharon alternates from 30mg qod to 45mg qod\par        feels better, denies palpitations, forgets to take the 15mg on and off about twice a week per pt \par        negative cardiac workup per pt\par        liked it but she can not afford two copays, asking to try 60mg\par        will repeat labs first and decide depending on her results\par        seen Dr Potts before who diagnosed her with hypothyroidism, felt really tired, pains all over\par        last labwork 8/12/16 by Dr Chen her asthma doctor has an echocardiogram scheduled , sees Cardiology Dr Prasad on [address]\par        AST mildly elevated 46\par        TSH 0.33\par        thyroid US 9/2015 showed 7mm isthmus nodule\par        denies FHx thyroid Ca\par        denies neck iraadiation\par        has problems swallowing solid food, on and off\par        has dysphonia on and off\par        has asthma, has dyspnea\par        repeated thyroid US 9/2016 stable 7mm isthmus nodule.

## 2022-10-19 ENCOUNTER — APPOINTMENT (OUTPATIENT)
Dept: PAIN MANAGEMENT | Facility: CLINIC | Age: 57
End: 2022-10-19

## 2022-10-19 VITALS
DIASTOLIC BLOOD PRESSURE: 80 MMHG | HEIGHT: 63 IN | BODY MASS INDEX: 29.06 KG/M2 | SYSTOLIC BLOOD PRESSURE: 122 MMHG | WEIGHT: 164 LBS | TEMPERATURE: 98 F

## 2022-10-19 PROCEDURE — 99214 OFFICE O/P EST MOD 30 MIN: CPT

## 2022-10-19 NOTE — ASSESSMENT
[FreeTextEntry1] : >> Imaging and Other Studies\par \par I personally reviewed the relevant imaging.  Discussed and explained to patient the likely source of pathology and pain.  Questions answered. MRI \par \par >> Therapy and Other Modalities\par \par PT\par \par >> Medications\par \par acetaminophen 650mg q8h prn pain (caution <3g daily)\par  \par >> Interventions\par \par Significant component of axial back pain secondary to lumbar spondylosis and facet arthropathy demonstrated on MRI LS.  Pain refractory to conservative treatments.  sp BILATERAL L4-sacral ala diagnostic medial branch block (2 joints, 3 nerves on each side) \par \par Significant component of back and leg pain likely secondary to lumbar spinal stenosis demonstrated on MRI LS.  Will schedule L4-5 interlaminar epidural steroid injection r/b/a discussed\par \par \par >> Consults\par \par fu with heme onc\par fu with GI - rx sent from last visit\par \par >> Discussion of Risks/Benefits/Alternatives\par \par 	>Regarding any scheduled procedures:\par \par I have discussed in detail with the patient that any interventional pain procedure is associated with potential risks.  The procedure may include an injection of steroids and potentially other medications (local anesthetic and normal saline) into the epidural space or surrounding tissue of the spine.  There are significant risks of this procedure which include and are not limited to infection, bleeding, worsening pain, dural puncture leading to postdural puncture headache, nerve damage, spinal cord injury, paralysis, stroke, and death.  \par \par There is a chance that the procedure does not improve their pain.  \par \par There are risks associated with the steroid being absorbed into the body systemically.  These include dysphoria, difficulty sleeping, mood swings and personality changes.  Premenopausal women may notice an irregularity in her menstrual cycle for 2-3 months following the injection.  Steroids can specifically affect patients with hypertension, diabetes, and peptic ulcers.  The procedure may cause a temporary increase in blood pressure and blood pressure, and may adversely affect a peptic ulcer.  Other, more rare complications, include avascular necrosis of joints, glaucoma and worsening of osteoporosis. \par \par I have discussed the risks of the procedure at length with the patient, and the potential benefits of pain relief.  I have offered alternatives to the procedure.  All questions were answered.  \par \par The patient expressed understanding and wishes to proceed with the procedure.\par \par 	>Regarding COVID19 Pandemic: \par \par Any planned interventional pain procedure are scheduled because further delay may cause harm or negative outcome to patient.  The goal in performing this procedure is to avoid deterioration of function, emergency room visits (which increases exposure) and reliance on opioids.  \par \par r/b/a discussed with patient, lack of evidence to conclusively determine whether pain management procedures have any positive or negative impact on the possibility of jojo the virus and/or development of any sequelae. \par \par Patient counselled regarding timing steroid based intervention 2 weeks before or after COVID-19 vaccine administration to avoid any interaction or affect on efficacy of vaccination\par \par Patient demonstrates understanding\par \par Informed patient that risks associated with the COVID-19 infection.  Informed patient steps taken to limit the risks.  We are implementing safety precautions and following protocols consistent with the CDC and state recommendations. All patients and staff will be checked for fever or signs of illness upon entry to the facility. We will limit our steroid dose to the lowest effective therapeutic dose or in some cases steroids will not be injected at all. \par \par Patient agrees to proceed\par \par >> Conclusion\par \par The above diagnosis and treatment plan is medically reasonable and necessary based on the patient encounter \par There were no barriers to communication.\par Informed patient that I would be available for any additional questions.\par Patient was instructed to call with any worsening symptoms including severe pain, new numbness/weakness, or changes in the bowel/bladder function. \par Discussed role of nsaids in pain management and all relevant risks, if patient is continuing to require after 4 weeks the patient should f/u for alternative treatment. \par Instructed patient to maintain pain diary to monitor pain level, mobility, and function.\par

## 2022-10-19 NOTE — HISTORY OF PRESENT ILLNESS
[8] : 3. What number best describes how, during the past week, pain has interfered with your general activity? 8/10 pain [___ yrs] : [unfilled] year(s) ago [Constant] : constant [9] : a minimum pain level of 9/10 [10] : a maximum pain level of 10/10 [Aching] : aching [Throbbing] : throbbing [Bending] : bending [Heat] : heat [Ice] : ice [FreeTextEntry1] : Interval Note:\par \par Since last visit the pain is is not improvement sp bilateral L4-sacral ala medial branch block without significant improvement.  Pain is so bad that patient finds it difficult to perform adls and ambulate.  She was evaluated by Dr. Wolf who did not currently prescribe any treatments.  Patient reports that she continues to have back and periumbilical pain worse with transition and ambulation. \par \par \par \par HPI\par \par  \par \par Ms. JAG TALBERT is a 56 year F with sclerosing mesenteritis (off steroids) referred by rheumatologist for pain management. Pain to periumbilical area. In addition c/o of axial lower back and bilateral groin pain x 10 yrs. Worsening throughout the day. Difficulty to walk and complete ADL's. Denies any additional weakness, numbness, bowel/bladder dysfunction.\par \par \par \par Previous and current pain medications/doses/effects: Aspercreme, Gabapentin, Tylenol\par \par  \par \par Previous Pain Treatments: Physcial Therapy ~2 yrs ago\par \par  \par \par Previous Pain Injections: \par \par bilateral L4-sacral ala medial branch block without significant improvement\par \par  \par \par Previous Diagnostic Studies/Images: \par \par MRI of lumbar spine without contrast 4/5/2022\par \par CLINICAL INFORMATION:\par Lumber radiculopathy\par TECHNIQUE:\par T1 and T2-weighted sagittal and axial images of lumbar spine are obtained.\par COMPARISON:\par MRI lumbar spine performed on 03/14/2014.\par FINDINGS:\par The vertebral height, bone marrow signal and bony alignment of lumbar spine are unremarkable\par Desiccation of intervertebral disc spaces is noted throughout the lumber spine due to degenerative\par disc changes. The conus is visualized at L1 level with normal morphology and signal intensity.\par L2-3 and L3-4 levels demonstrate mild bulging annulus indenting the ventral dural sac without\par significant spinal stenosis.\par At L4-5 level, there is broad-based central disc herniation indenting the ventral dural sac without\par significant spinal stenosis.\par Other levels of lumbar spine demonstrate no significant disk herniation or spinal stenosis.\par Impression:\par Broad-based mild central disc herniation is noted at L4-5 level without significant spinal stenosis.\par When compared to prior MRI exarination lumbar spine performed on 03/14/2014, no significant\par interval change is noted.\par Electronically Signed By: Art Angeles on April 05, 2022 08:34 PM\par \par \par  \par \par \par  [FreeTextEntry2] : 24 [FreeTextEntry7] : Back Pain  [FreeTextEntry4] : PT

## 2022-11-02 ENCOUNTER — APPOINTMENT (OUTPATIENT)
Dept: PAIN MANAGEMENT | Facility: CLINIC | Age: 57
End: 2022-11-02
Payer: MEDICAID

## 2022-11-02 VITALS
HEIGHT: 63 IN | OXYGEN SATURATION: 97 % | TEMPERATURE: 98 F | BODY MASS INDEX: 29.06 KG/M2 | SYSTOLIC BLOOD PRESSURE: 136 MMHG | WEIGHT: 164 LBS | DIASTOLIC BLOOD PRESSURE: 68 MMHG | HEART RATE: 65 BPM | RESPIRATION RATE: 16 BRPM

## 2022-11-02 PROCEDURE — 62323 NJX INTERLAMINAR LMBR/SAC: CPT

## 2022-11-22 ENCOUNTER — APPOINTMENT (OUTPATIENT)
Dept: PAIN MANAGEMENT | Facility: CLINIC | Age: 57
End: 2022-11-22

## 2022-11-22 PROCEDURE — 99214 OFFICE O/P EST MOD 30 MIN: CPT | Mod: 95

## 2022-11-22 NOTE — HISTORY OF PRESENT ILLNESS
[___ yrs] : [unfilled] year(s) ago [Constant] : constant [9] : a minimum pain level of 9/10 [10] : a maximum pain level of 10/10 [Aching] : aching [Throbbing] : throbbing [Bending] : bending [Heat] : heat [Ice] : ice [Home] : at home, [unfilled] , at the time of the visit. [Medical Office: ([facility])___] : at the medical office located in  [FreeTextEntry1] : Interval Note:\par sp  L4-5 interlaminar epidural steroid injection with significant improvement in back pain.  Able to perform adls without significant pain.  Patient not requiring significant analgesics.  She is doing well and sleeping better. denies any worsening numbness, weakness, bowel/bladder dysfunction. \par \par \par \par HPI\par \par  \par \par Ms. JAG TALBERT is a 56 year F with sclerosing mesenteritis (off steroids) referred by rheumatologist for pain management. Pain to periumbilical area. In addition c/o of axial lower back and bilateral groin pain x 10 yrs. Worsening throughout the day. Difficulty to walk and complete ADL's. Denies any additional weakness, numbness, bowel/bladder dysfunction.\par \par \par \par Previous and current pain medications/doses/effects: Aspercreme, Gabapentin, Tylenol\par \par  \par \par Previous Pain Treatments: Physcial Therapy ~2 yrs ago\par \par  \par \par Previous Pain Injections: \par \par bilateral L4-sacral ala medial branch block without significant improvement\par \par  \par \par Previous Diagnostic Studies/Images: \par \par MRI of lumbar spine without contrast 4/5/2022\par \par CLINICAL INFORMATION:\par Lumber radiculopathy\par TECHNIQUE:\par T1 and T2-weighted sagittal and axial images of lumbar spine are obtained.\par COMPARISON:\par MRI lumbar spine performed on 03/14/2014.\par FINDINGS:\par The vertebral height, bone marrow signal and bony alignment of lumbar spine are unremarkable\par Desiccation of intervertebral disc spaces is noted throughout the lumber spine due to degenerative\par disc changes. The conus is visualized at L1 level with normal morphology and signal intensity.\par L2-3 and L3-4 levels demonstrate mild bulging annulus indenting the ventral dural sac without\par significant spinal stenosis.\par At L4-5 level, there is broad-based central disc herniation indenting the ventral dural sac without\par significant spinal stenosis.\par Other levels of lumbar spine demonstrate no significant disk herniation or spinal stenosis.\par Impression:\par Broad-based mild central disc herniation is noted at L4-5 level without significant spinal stenosis.\par When compared to prior MRI exarination lumbar spine performed on 03/14/2014, no significant\par interval change is noted.\par Electronically Signed By: Art Angeles on April 05, 2022 08:34 PM\par \par \par  \par \par \par  [FreeTextEntry7] : Back Pain  [FreeTextEntry4] : PT

## 2022-11-22 NOTE — PHYSICAL EXAM
[de-identified] : \par Constitutional: Normal, well developed, no acute distress on audio/video examination\par Eyes: Symmetric, External structures on video examination\par ENT: Lips, mucosa and tongue normal on video examination\par Oropharynx: Lips normal, symmetric, no external lesions appreciated appreciated on video examination\par Respiratory: Non-labored breathing, no audible wheezes appreciated on audio/video examination\par Vascular: No cyanosis appreciated or edema appreciated on video examination\par GI:  no jaundice appreciated on video examination\par Neurovascular: CN grossly intact on video/audio examination, alert\par MSK: Normal muscle bulk on video examination\par

## 2022-11-22 NOTE — ASSESSMENT
[FreeTextEntry1] : >> Imaging and Other Studies\par \par I personally reviewed the relevant imaging.  Discussed and explained to patient the likely source of pathology and pain.  Questions answered. MRI \par \par >> Therapy and Other Modalities\par \par start PT\par \par >> Medications\par \par acetaminophen 650mg q8h prn pain (caution <3g daily)\par  \par >> Interventions\par \par Significant component of axial back pain secondary to lumbar spondylosis and facet arthropathy demonstrated on MRI LS.  Pain refractory to conservative treatments.  sp BILATERAL L4-sacral ala diagnostic medial branch block (2 joints, 3 nerves on each side) \par \par Significant component of back and leg pain likely secondary to lumbar spinal stenosis demonstrated on MRI LS.  sp  L4-5 interlaminar epidural steroid injection wish significant improvement\par \par \par >> Consults\par \par fu with heme onc\par fu with GI - rx sent from last visit\par \par >> Discussion of Risks/Benefits/Alternatives\par \par 	>Regarding any scheduled procedures:\par \par I have discussed in detail with the patient that any interventional pain procedure is associated with potential risks.  The procedure may include an injection of steroids and potentially other medications (local anesthetic and normal saline) into the epidural space or surrounding tissue of the spine.  There are significant risks of this procedure which include and are not limited to infection, bleeding, worsening pain, dural puncture leading to postdural puncture headache, nerve damage, spinal cord injury, paralysis, stroke, and death.  \par \par There is a chance that the procedure does not improve their pain.  \par \par There are risks associated with the steroid being absorbed into the body systemically.  These include dysphoria, difficulty sleeping, mood swings and personality changes.  Premenopausal women may notice an irregularity in her menstrual cycle for 2-3 months following the injection.  Steroids can specifically affect patients with hypertension, diabetes, and peptic ulcers.  The procedure may cause a temporary increase in blood pressure and blood pressure, and may adversely affect a peptic ulcer.  Other, more rare complications, include avascular necrosis of joints, glaucoma and worsening of osteoporosis. \par \par I have discussed the risks of the procedure at length with the patient, and the potential benefits of pain relief.  I have offered alternatives to the procedure.  All questions were answered.  \par \par The patient expressed understanding and wishes to proceed with the procedure.\par \par 	>Regarding COVID19 Pandemic: \par \par Any planned interventional pain procedure are scheduled because further delay may cause harm or negative outcome to patient.  The goal in performing this procedure is to avoid deterioration of function, emergency room visits (which increases exposure) and reliance on opioids.  \par \par r/b/a discussed with patient, lack of evidence to conclusively determine whether pain management procedures have any positive or negative impact on the possibility of jojo the virus and/or development of any sequelae. \par \par Patient counselled regarding timing steroid based intervention 2 weeks before or after COVID-19 vaccine administration to avoid any interaction or affect on efficacy of vaccination\par \par Patient demonstrates understanding\par \par Informed patient that risks associated with the COVID-19 infection.  Informed patient steps taken to limit the risks.  We are implementing safety precautions and following protocols consistent with the CDC and state recommendations. All patients and staff will be checked for fever or signs of illness upon entry to the facility. We will limit our steroid dose to the lowest effective therapeutic dose or in some cases steroids will not be injected at all. \par \par Patient agrees to proceed\par \par >> Conclusion\par \par The above diagnosis and treatment plan is medically reasonable and necessary based on the patient encounter \par There were no barriers to communication.\par Informed patient that I would be available for any additional questions.\par Patient was instructed to call with any worsening symptoms including severe pain, new numbness/weakness, or changes in the bowel/bladder function. \par Discussed role of nsaids in pain management and all relevant risks, if patient is continuing to require after 4 weeks the patient should f/u for alternative treatment. \par Instructed patient to maintain pain diary to monitor pain level, mobility, and function.\par \par I explained to patient benefits and limitation of TeleMedicine visits\par \par Patient understands that limitations include inability to perform comprehensive physical exam, which may lead to potential diagnostic inconsistencies.  \par \par Any scheduled procedures are based on history, imaging and limited physical exam performed on TeleHealth visit.  If necessary, additional focal physical exam will be performed on date of procedure\par \par Patient understands that diagnosis and treatment may be limited by these inconsistencies and patient agrees to proceed with care plan\par \par \par

## 2022-11-25 ENCOUNTER — APPOINTMENT (OUTPATIENT)
Dept: FAMILY MEDICINE | Facility: CLINIC | Age: 57
End: 2022-11-25

## 2022-11-25 VITALS
HEART RATE: 66 BPM | SYSTOLIC BLOOD PRESSURE: 120 MMHG | WEIGHT: 164 LBS | BODY MASS INDEX: 29.06 KG/M2 | OXYGEN SATURATION: 97 % | HEIGHT: 63 IN | RESPIRATION RATE: 15 BRPM | DIASTOLIC BLOOD PRESSURE: 60 MMHG | TEMPERATURE: 97.4 F

## 2022-11-25 DIAGNOSIS — L29.9 PRURITUS, UNSPECIFIED: ICD-10-CM

## 2022-11-25 PROCEDURE — 99213 OFFICE O/P EST LOW 20 MIN: CPT

## 2022-11-25 RX ORDER — PANTOPRAZOLE 40 MG/1
40 TABLET, DELAYED RELEASE ORAL
Qty: 90 | Refills: 3 | Status: DISCONTINUED | COMMUNITY
Start: 2021-11-09 | End: 2022-11-25

## 2022-11-25 RX ORDER — ALPRAZOLAM 2 MG/1
TABLET ORAL
Refills: 0 | Status: DISCONTINUED | COMMUNITY
End: 2022-11-25

## 2022-11-25 RX ORDER — FLUOXETINE HCL 10 MG
10 TABLET ORAL
Refills: 0 | Status: DISCONTINUED | COMMUNITY
End: 2022-11-25

## 2022-11-28 PROBLEM — L29.9 PRURITUS: Status: ACTIVE | Noted: 2022-11-25

## 2022-11-28 NOTE — PHYSICAL EXAM
[No Acute Distress] : no acute distress [Well Nourished] : well nourished [Well Developed] : well developed [Well-Appearing] : well-appearing [Normal Sclera/Conjunctiva] : normal sclera/conjunctiva [PERRL] : pupils equal round and reactive to light [EOMI] : extraocular movements intact [Normal Outer Ear/Nose] : the outer ears and nose were normal in appearance [Normal Oropharynx] : the oropharynx was normal [No JVD] : no jugular venous distention [No Lymphadenopathy] : no lymphadenopathy [Supple] : supple [Thyroid Normal, No Nodules] : the thyroid was normal and there were no nodules present [No Respiratory Distress] : no respiratory distress  [No Accessory Muscle Use] : no accessory muscle use [Clear to Auscultation] : lungs were clear to auscultation bilaterally [Normal Rate] : normal rate  [Regular Rhythm] : with a regular rhythm [Normal S1, S2] : normal S1 and S2 [No Murmur] : no murmur heard [No Carotid Bruits] : no carotid bruits [No Abdominal Bruit] : a ~M bruit was not heard ~T in the abdomen [No Varicosities] : no varicosities [Pedal Pulses Present] : the pedal pulses are present [No Edema] : there was no peripheral edema [No Palpable Aorta] : no palpable aorta [No Extremity Clubbing/Cyanosis] : no extremity clubbing/cyanosis [Soft] : abdomen soft [Non Tender] : non-tender [Non-distended] : non-distended [No Masses] : no abdominal mass palpated [No HSM] : no HSM [Normal Bowel Sounds] : normal bowel sounds [Normal Posterior Cervical Nodes] : no posterior cervical lymphadenopathy [Normal Anterior Cervical Nodes] : no anterior cervical lymphadenopathy [No CVA Tenderness] : no CVA  tenderness [No Spinal Tenderness] : no spinal tenderness [No Joint Swelling] : no joint swelling [Grossly Normal Strength/Tone] : grossly normal strength/tone [Coordination Grossly Intact] : coordination grossly intact [No Focal Deficits] : no focal deficits [Normal Gait] : normal gait [Deep Tendon Reflexes (DTR)] : deep tendon reflexes were 2+ and symmetric [Normal Affect] : the affect was normal [Normal Insight/Judgement] : insight and judgment were intact [de-identified] : dry skin

## 2022-11-28 NOTE — HISTORY OF PRESENT ILLNESS
[FreeTextEntry8] : 57 year old female presenting for a itchyness. she used ammonium lactate before which helped.

## 2022-12-21 ENCOUNTER — APPOINTMENT (OUTPATIENT)
Dept: PAIN MANAGEMENT | Facility: CLINIC | Age: 57
End: 2022-12-21

## 2022-12-23 ENCOUNTER — APPOINTMENT (OUTPATIENT)
Dept: HEMATOLOGY ONCOLOGY | Facility: CLINIC | Age: 57
End: 2022-12-23
Payer: MEDICAID

## 2023-01-20 ENCOUNTER — RESULT REVIEW (OUTPATIENT)
Age: 58
End: 2023-01-20

## 2023-01-20 ENCOUNTER — APPOINTMENT (OUTPATIENT)
Dept: HEMATOLOGY ONCOLOGY | Facility: CLINIC | Age: 58
End: 2023-01-20
Payer: MEDICAID

## 2023-01-20 VITALS
TEMPERATURE: 96.9 F | SYSTOLIC BLOOD PRESSURE: 98 MMHG | WEIGHT: 169.13 LBS | DIASTOLIC BLOOD PRESSURE: 58 MMHG | HEART RATE: 79 BPM | BODY MASS INDEX: 29.97 KG/M2 | HEIGHT: 63 IN | RESPIRATION RATE: 16 BRPM | OXYGEN SATURATION: 95 %

## 2023-01-20 PROCEDURE — 99214 OFFICE O/P EST MOD 30 MIN: CPT | Mod: 25

## 2023-01-20 NOTE — HISTORY OF PRESENT ILLNESS
[de-identified] : 55 year old female presents today for initial consultation of  a questionable retroperitoneal mass and lung nodules.  She presented to ER in July with complaints of abdominal pain x 2 weeks, she was found to have infiltration of the retroperitoneal mesentery, extensive shotty adenopathy, 4cm soft tissue mass, and was admitted for further management to rule out malignancy.  General Surgeon Dr. Butler consulted and recommended IR guided biopsy.  Outpatient Pulmonologist Dr. Knox consulted.  Outpatient PET scan which showed no uptake in lungs or abdomen and has repeat CT Chest in September 2021.  No IR or surgical intervention recommended at this time.  \par \par PET SCAN 3/2021- No evidence of hypermetabolic focus in the lung parenchyma. Bilateral pulmonary opacities- F/u 6 month diagnostic CT chest is advised.  No evidence of hypermetabolic activity in the mediastinal and cervical nodes.  Diffuse hypermetabolic activity in the thyroid gland can be secondary to thyroiditis.  \par \par CT chest 9/10/2021- Multiple diverticuli in lower descending colon and sigmoid colon, without evidence of acute diverticulitis.  Multiple calculi in normal sized gallbladder, without additional CT signs of acute cholecystitis no evidence of biliary duct dilatation.  No change in hazy increased density in mesentery, multiple small mesenteric lymph nodes, or in\par lobulated left mesenteric mass that is partially calcified and measures approximately 4 x 2 x 3 cm, since prior CT of 7/8/2021.  No other evidence of acute abdominal or pelvic pathology.\par \par Colonoscopy done- Aug 26, 2021- pt reports WNL with Dr. Nunez\par \par Family Hx- MGM lung cancer (smoker) PGM metastatic breast cancer postmenopausal, father with colon cancer- alive, paternal cousin with colon cancer- alive \par Smoking Hx- smoked for 29 years, quit 16 years ago, smoked 2 PPD [de-identified] : Patient is seen today for follow up\par She was last seen by Dr Garay in Feb 2022 and Dr Fonseca (1/2022) and subsequently decided to stop following\par \par Abdominal pain happens intermittently - mostly around the umbilical area. Now happens less frequently\par She has not been on prednisone for a while\par Her major symptom is back pain for which she follows with Dr Hernandez\par \par Quit smoking 16 years ago. On Lung cancer screening program

## 2023-01-20 NOTE — ASSESSMENT
[FreeTextEntry1] : Sclerosing Mesenteritis\par Biopsy proven \par INtermittent abdominal pain\par Has received prednisone periodically\par Was briefly lost to follow up\par Seen today for follow up\par No abdominal pain\par No tenderness on palpation\par Labs ordered, drawn in the office, reviewed, analyzed and discussed\par ESR slightly better\par Explained that for recurrent pain we can consider tamoxifen+prednisone combination \par For lack of response- will consider pentoxifylline or azathioprine\par Advised to be uptodate with her age appropriate cancer screening which was discussed\par \par Social Hx\par Lives with her  in [place]\par Used to work in [employer], not current employed\par \par Patient had multiple questions which were answered to satisfaction\par \par Follow up in 4-6 months or sooner if needed\par CBC, CMP, ESR, CRP

## 2023-02-10 ENCOUNTER — APPOINTMENT (OUTPATIENT)
Dept: ENDOCRINOLOGY | Facility: CLINIC | Age: 58
End: 2023-02-10
Payer: MEDICAID

## 2023-02-10 VITALS
HEART RATE: 73 BPM | HEIGHT: 63 IN | OXYGEN SATURATION: 98 % | BODY MASS INDEX: 30.12 KG/M2 | SYSTOLIC BLOOD PRESSURE: 98 MMHG | DIASTOLIC BLOOD PRESSURE: 62 MMHG | WEIGHT: 170 LBS

## 2023-02-10 LAB
T4 FREE SERPL-MCNC: 1.4 NG/DL
TSH SERPL-ACNC: 2.89 UIU/ML

## 2023-02-10 PROCEDURE — 99215 OFFICE O/P EST HI 40 MIN: CPT | Mod: 25

## 2023-02-10 RX ORDER — LEVOCETIRIZINE DIHYDROCHLORIDE 5 MG/1
5 TABLET ORAL
Qty: 90 | Refills: 0 | Status: DISCONTINUED | COMMUNITY
Start: 2022-11-25 | End: 2023-02-10

## 2023-02-10 NOTE — HISTORY OF PRESENT ILLNESS
[FreeTextEntry1] : Ms. JAG TALBERT is a 57 year old female\par coming for f/u hypothyroidism, also diagnosed with Ostoporosis on last DEXA 9/22\par denies h/o fractures\par denies h/o kidney stones\par denies FHX osteoporosis \par denies FHx hip fractures\par on Vit D 2,000 IU qd\par just started calcium a week ago , does not know how much she takes \par still having hair loss has dermatology remi  March 2023\par        used to be on Levothyroxine 75mcg same dose for about a month dose increased from 88mcg RYAN Synthroid by her PCP by her PCP Dr Preet Gutierrez [place]\par        11/7/2016 was swiched from Synthroid to Doyline alternates from 30mg qod to 45mg qod\par        feels better, denies palpitations, forgets to take the 15mg on and off about twice a week per pt \par        negative cardiac workup per pt\par        liked it but she can not afford two copays, asking to try 60mg\par        will repeat labs first and decide depending on her results\par        seen Dr Potts before who diagnosed her with hypothyroidism, felt really tired, pains all over\par        last labwork 8/12/16 by Dr Chen her asthma doctor has an echocardiogram scheduled , sees Cardiology Dr Prasad on [address]\par        AST mildly elevated 46\par        TSH 0.33\par        thyroid US 9/2015 showed 7mm isthmus nodule\par        denies FHx thyroid Ca\par        denies neck iraadiation\par        has problems swallowing solid food, on and off\par        has dysphonia on and off\par        has asthma, has dyspnea\par        repeated thyroid US 9/2016 stable 7mm isthmus nodule.

## 2023-02-10 NOTE — REASON FOR VISIT
[Follow-Up: _____] : a [unfilled] follow-up visit [Follow - Up] : a follow-up visit [Hypothyroidism] : hypothyroidism [FreeTextEntry1] : Thyroid [Osteoporosis] : osteoporosis

## 2023-02-17 LAB
25(OH)D3 SERPL-MCNC: 38.1 NG/ML
ALBUMIN SERPL ELPH-MCNC: 4.3 G/DL
ALP BLD-CCNC: 77 U/L
ALT SERPL-CCNC: 16 U/L
ANION GAP SERPL CALC-SCNC: 14 MMOL/L
AST SERPL-CCNC: 26 U/L
BILIRUB SERPL-MCNC: 0.3 MG/DL
BUN SERPL-MCNC: 14 MG/DL
CALCIUM SERPL-MCNC: 10 MG/DL
CALCIUM SERPL-MCNC: 10 MG/DL
CHLORIDE SERPL-SCNC: 102 MMOL/L
CO2 SERPL-SCNC: 22 MMOL/L
CREAT SERPL-MCNC: 0.77 MG/DL
EGFR: 89 ML/MIN/1.73M2
GLUCOSE SERPL-MCNC: 113 MG/DL
PARATHYROID HORMONE INTACT: 40 PG/ML
POTASSIUM SERPL-SCNC: 4.7 MMOL/L
PROT SERPL-MCNC: 7.1 G/DL
SODIUM SERPL-SCNC: 139 MMOL/L

## 2023-02-24 RX ORDER — MULTIVIT-MIN/IRON/FOLIC ACID/K 18-600-40
50 MCG CAPSULE ORAL
Refills: 0 | Status: ACTIVE | COMMUNITY

## 2023-02-24 RX ORDER — MULTIVIT-MIN/FOLIC/VIT K/LYCOP 400-300MCG
25 MCG TABLET ORAL
Qty: 30 | Refills: 0 | Status: DISCONTINUED | COMMUNITY
Start: 2022-06-06 | End: 2023-02-24

## 2023-03-02 ENCOUNTER — APPOINTMENT (OUTPATIENT)
Dept: PAIN MANAGEMENT | Facility: CLINIC | Age: 58
End: 2023-03-02
Payer: MEDICAID

## 2023-03-02 VITALS
HEIGHT: 63 IN | TEMPERATURE: 98 F | BODY MASS INDEX: 30.12 KG/M2 | DIASTOLIC BLOOD PRESSURE: 82 MMHG | SYSTOLIC BLOOD PRESSURE: 128 MMHG | WEIGHT: 170 LBS

## 2023-03-02 PROCEDURE — 99214 OFFICE O/P EST MOD 30 MIN: CPT

## 2023-03-02 NOTE — HISTORY OF PRESENT ILLNESS
[___ yrs] : [unfilled] year(s) ago [Constant] : constant [9] : a minimum pain level of 9/10 [10] : a maximum pain level of 10/10 [Aching] : aching [Throbbing] : throbbing [Bending] : bending [Heat] : heat [Ice] : ice [FreeTextEntry1] : Interval Note:\par \par sp  L4-5 interlaminar epidural steroid injection with significant improvement in back pain.  Able to perform adls without significant pain.  Patient not requiring significant analgesics.  \par \par Reports that abdominal pain is affecting her her.  She feels bloated at times.  She has seen multiple GI who has not recommended effective treatment.  denies any worsening numbness, weakness, bowel/bladder dysfunction. \par \par \par \par HPI\par \par  \par \par Ms. JAG TALBERT is a 56 year F with sclerosing mesenteritis (off steroids) referred by rheumatologist for pain management. Pain to periumbilical area. In addition c/o of axial lower back and bilateral groin pain x 10 yrs. Worsening throughout the day. Difficulty to walk and complete ADL's. Denies any additional weakness, numbness, bowel/bladder dysfunction.\par \par \par \par Previous and current pain medications/doses/effects: Aspercreme, Gabapentin, Tylenol\par \par  \par \par Previous Pain Treatments: Physcial Therapy ~2 yrs ago\par \par  \par \par Previous Pain Injections: \par \par bilateral L4-sacral ala medial branch block without significant improvement\par \par  \par \par Previous Diagnostic Studies/Images: \par \par MRI of lumbar spine without contrast 4/5/2022\par \par CLINICAL INFORMATION:\par Lumber radiculopathy\par TECHNIQUE:\par T1 and T2-weighted sagittal and axial images of lumbar spine are obtained.\par COMPARISON:\par MRI lumbar spine performed on 03/14/2014.\par FINDINGS:\par The vertebral height, bone marrow signal and bony alignment of lumbar spine are unremarkable\par Desiccation of intervertebral disc spaces is noted throughout the lumber spine due to degenerative\par disc changes. The conus is visualized at L1 level with normal morphology and signal intensity.\par L2-3 and L3-4 levels demonstrate mild bulging annulus indenting the ventral dural sac without\par significant spinal stenosis.\par At L4-5 level, there is broad-based central disc herniation indenting the ventral dural sac without\par significant spinal stenosis.\par Other levels of lumbar spine demonstrate no significant disk herniation or spinal stenosis.\par Impression:\par Broad-based mild central disc herniation is noted at L4-5 level without significant spinal stenosis.\par When compared to prior MRI exarination lumbar spine performed on 03/14/2014, no significant\par interval change is noted.\par Electronically Signed By: Art Angeles on April 05, 2022 08:34 PM\par \par \par  \par \par \par  [FreeTextEntry2] : 25 [FreeTextEntry7] : Back Pain  [FreeTextEntry4] : PT

## 2023-03-02 NOTE — ASSESSMENT
[FreeTextEntry1] : >> Imaging and Other Studies\par \par I personally reviewed the relevant imaging.  Discussed and explained to patient the likely source of pathology and pain.  Questions answered. MRI \par \par >> Therapy and Other Modalities\par \par start PT\par \par >> Medications\par \par trial cymbalta 30mg\par cautioned change in mood.  Encouraged to call with any worsening mood or depression/suicidal ideations\par \par acetaminophen 650mg q8h prn pain (caution <3g daily)\par  \par >> Interventions\par \par Significant component of axial back pain secondary to lumbar spondylosis and facet arthropathy demonstrated on MRI LS.  Pain refractory to conservative treatments.  sp BILATERAL L4-sacral ala diagnostic medial branch block (2 joints, 3 nerves on each side) \par \par Significant component of back and leg pain likely secondary to lumbar spinal stenosis demonstrated on MRI LS.  sp  L4-5 interlaminar epidural steroid injection wish significant improvement\par \par \par >> Consults\par \par fu with heme onc\par fu with GI - rx sent from last visit\par \par >> Discussion of Risks/Benefits/Alternatives\par \par 	>Regarding any scheduled procedures:\par \par I have discussed in detail with the patient that any interventional pain procedure is associated with potential risks.  The procedure may include an injection of steroids and potentially other medications (local anesthetic and normal saline) into the epidural space or surrounding tissue of the spine.  There are significant risks of this procedure which include and are not limited to infection, bleeding, worsening pain, dural puncture leading to postdural puncture headache, nerve damage, spinal cord injury, paralysis, stroke, and death.  \par \par There is a chance that the procedure does not improve their pain.  \par \par There are risks associated with the steroid being absorbed into the body systemically.  These include dysphoria, difficulty sleeping, mood swings and personality changes.  Premenopausal women may notice an irregularity in her menstrual cycle for 2-3 months following the injection.  Steroids can specifically affect patients with hypertension, diabetes, and peptic ulcers.  The procedure may cause a temporary increase in blood pressure and blood pressure, and may adversely affect a peptic ulcer.  Other, more rare complications, include avascular necrosis of joints, glaucoma and worsening of osteoporosis. \par \par I have discussed the risks of the procedure at length with the patient, and the potential benefits of pain relief.  I have offered alternatives to the procedure.  All questions were answered.  \par \par The patient expressed understanding and wishes to proceed with the procedure.\par \par 	>Regarding COVID19 Pandemic: \par \par Any planned interventional pain procedure are scheduled because further delay may cause harm or negative outcome to patient.  The goal in performing this procedure is to avoid deterioration of function, emergency room visits (which increases exposure) and reliance on opioids.  \par \par r/b/a discussed with patient, lack of evidence to conclusively determine whether pain management procedures have any positive or negative impact on the possibility of jojo the virus and/or development of any sequelae. \par \par Patient counselled regarding timing steroid based intervention 2 weeks before or after COVID-19 vaccine administration to avoid any interaction or affect on efficacy of vaccination\par \par Patient demonstrates understanding\par \par Informed patient that risks associated with the COVID-19 infection.  Informed patient steps taken to limit the risks.  We are implementing safety precautions and following protocols consistent with the CDC and state recommendations. All patients and staff will be checked for fever or signs of illness upon entry to the facility. We will limit our steroid dose to the lowest effective therapeutic dose or in some cases steroids will not be injected at all. \par \par Patient agrees to proceed\par \par >> Conclusion\par \par The above diagnosis and treatment plan is medically reasonable and necessary based on the patient encounter \par There were no barriers to communication.\par Informed patient that I would be available for any additional questions.\par Patient was instructed to call with any worsening symptoms including severe pain, new numbness/weakness, or changes in the bowel/bladder function. \par Discussed role of nsaids in pain management and all relevant risks, if patient is continuing to require after 4 weeks the patient should f/u for alternative treatment. \par Instructed patient to maintain pain diary to monitor pain level, mobility, and function.\par \par \par

## 2023-03-28 ENCOUNTER — APPOINTMENT (OUTPATIENT)
Dept: FAMILY MEDICINE | Facility: CLINIC | Age: 58
End: 2023-03-28
Payer: MEDICAID

## 2023-03-28 VITALS
BODY MASS INDEX: 29.95 KG/M2 | TEMPERATURE: 98 F | DIASTOLIC BLOOD PRESSURE: 70 MMHG | HEART RATE: 66 BPM | WEIGHT: 169 LBS | HEIGHT: 63 IN | OXYGEN SATURATION: 98 % | SYSTOLIC BLOOD PRESSURE: 110 MMHG

## 2023-03-28 DIAGNOSIS — R39.9 UNSPECIFIED SYMPTOMS AND SIGNS INVOLVING THE GENITOURINARY SYSTEM: ICD-10-CM

## 2023-03-28 LAB
BILIRUB UR QL STRIP: NEGATIVE
CLARITY UR: CLEAR
COLLECTION METHOD: NORMAL
GLUCOSE UR-MCNC: NEGATIVE
HCG UR QL: 0.2 EU/DL
HGB UR QL STRIP.AUTO: NEGATIVE
KETONES UR-MCNC: NEGATIVE
LEUKOCYTE ESTERASE UR QL STRIP: NEGATIVE
NITRITE UR QL STRIP: NEGATIVE
PH UR STRIP: 6
PROT UR STRIP-MCNC: NEGATIVE
SP GR UR STRIP: 1.02

## 2023-03-28 PROCEDURE — 99213 OFFICE O/P EST LOW 20 MIN: CPT | Mod: 25

## 2023-03-28 PROCEDURE — 81003 URINALYSIS AUTO W/O SCOPE: CPT | Mod: QW

## 2023-03-28 RX ORDER — TRAMADOL HYDROCHLORIDE 50 MG/1
50 TABLET, COATED ORAL
Qty: 15 | Refills: 0 | Status: DISCONTINUED | COMMUNITY
Start: 2023-01-25 | End: 2023-03-28

## 2023-03-28 NOTE — HISTORY OF PRESENT ILLNESS
[FreeTextEntry8] : 57 year old female presenting with lower abdominal pain and malodorous urine for 2 days. Patient denies frequency, dysuria or urgency. Reports she barely drinks any water but drinks tea. Denies fever or chills.

## 2023-03-31 LAB — BACTERIA UR CULT: NORMAL

## 2023-04-07 ENCOUNTER — APPOINTMENT (OUTPATIENT)
Dept: PAIN MANAGEMENT | Facility: CLINIC | Age: 58
End: 2023-04-07
Payer: MEDICAID

## 2023-04-07 VITALS
HEIGHT: 63 IN | SYSTOLIC BLOOD PRESSURE: 122 MMHG | BODY MASS INDEX: 29.95 KG/M2 | WEIGHT: 169 LBS | DIASTOLIC BLOOD PRESSURE: 70 MMHG

## 2023-04-07 PROCEDURE — 99214 OFFICE O/P EST MOD 30 MIN: CPT

## 2023-04-07 NOTE — HISTORY OF PRESENT ILLNESS
[4] : 3. What number best describes how, during the past week, pain has interfered with your general activity? 4/10 pain [___ yrs] : [unfilled] year(s) ago [Constant] : constant [9] : a minimum pain level of 9/10 [10] : a maximum pain level of 10/10 [Aching] : aching [Throbbing] : throbbing [Bending] : bending [Heat] : heat [Ice] : ice [FreeTextEntry1] : Interval Note:\par \par sp  L4-5 interlaminar epidural steroid injection with significant improvement in back pain.  Able to perform adls without significant pain.  Patient not requiring significant analgesics.  \par continues cymbalta without any medication adverse effects. denies any worsening numbness, weakness, bowel/bladder dysfunction.  \par \par \par \par HPI\par \par  \par \par Ms. JAG TALBERT is a 56 year F with sclerosing mesenteritis (off steroids) referred by rheumatologist for pain management. Pain to periumbilical area. In addition c/o of axial lower back and bilateral groin pain x 10 yrs. Worsening throughout the day. Difficulty to walk and complete ADL's. Denies any additional weakness, numbness, bowel/bladder dysfunction.\par \par \par \par Previous and current pain medications/doses/effects: Aspercreme, Gabapentin, Tylenol\par \par  \par \par Previous Pain Treatments: Physcial Therapy ~2 yrs ago\par \par  \par \par Previous Pain Injections: \par  L4-5 interlaminar epidural steroid injection 11/22\par bilateral L4-sacral ala medial branch block without significant improvement\par \par  \par \par Previous Diagnostic Studies/Images: \par \par MRI of lumbar spine without contrast 4/5/2022\par \par CLINICAL INFORMATION:\par Lumber radiculopathy\par TECHNIQUE:\par T1 and T2-weighted sagittal and axial images of lumbar spine are obtained.\par COMPARISON:\par MRI lumbar spine performed on 03/14/2014.\par FINDINGS:\par The vertebral height, bone marrow signal and bony alignment of lumbar spine are unremarkable\par Desiccation of intervertebral disc spaces is noted throughout the lumber spine due to degenerative\par disc changes. The conus is visualized at L1 level with normal morphology and signal intensity.\par L2-3 and L3-4 levels demonstrate mild bulging annulus indenting the ventral dural sac without\par significant spinal stenosis.\par At L4-5 level, there is broad-based central disc herniation indenting the ventral dural sac without\par significant spinal stenosis.\par Other levels of lumbar spine demonstrate no significant disk herniation or spinal stenosis.\par Impression:\par Broad-based mild central disc herniation is noted at L4-5 level without significant spinal stenosis.\par When compared to prior MRI exarination lumbar spine performed on 03/14/2014, no significant\par interval change is noted.\par Electronically Signed By: Art Angeles on April 05, 2022 08:34 PM\par \par \par  \par \par \par  [FreeTextEntry2] : 10 [FreeTextEntry7] : Back Pain  [FreeTextEntry4] : PT

## 2023-04-07 NOTE — ASSESSMENT
[FreeTextEntry1] : >> Imaging and Other Studies\par \par I personally reviewed the relevant imaging.  Discussed and explained to patient the likely source of pathology and pain.  Questions answered. MRI \par \par >> Therapy and Other Modalities\par \par continue home exercises\par \par >> Medications\par \par continue cymbalta 30mg\par cautioned change in mood.  Encouraged to call with any worsening mood or depression/suicidal ideations\par \par acetaminophen 650mg q8h prn pain (caution <3g daily)\par  \par >> Interventions\par \par Significant component of axial back pain secondary to lumbar spondylosis and facet arthropathy demonstrated on MRI LS.  Pain refractory to conservative treatments.  sp BILATERAL L4-sacral ala diagnostic medial branch block (2 joints, 3 nerves on each side) \par \par Significant component of back and leg pain likely secondary to lumbar spinal stenosis demonstrated on MRI LS.  sp  L4-5 interlaminar epidural steroid injection wish significant improvement\par \par \par >> Consults\par \par fu with heme onc\par fu with GI - rx sent from last visit\par \par >> Discussion of Risks/Benefits/Alternatives\par \par 	>Regarding any scheduled procedures:\par \par I have discussed in detail with the patient that any interventional pain procedure is associated with potential risks.  The procedure may include an injection of steroids and potentially other medications (local anesthetic and normal saline) into the epidural space or surrounding tissue of the spine.  There are significant risks of this procedure which include and are not limited to infection, bleeding, worsening pain, dural puncture leading to postdural puncture headache, nerve damage, spinal cord injury, paralysis, stroke, and death.  \par \par There is a chance that the procedure does not improve their pain.  \par \par There are risks associated with the steroid being absorbed into the body systemically.  These include dysphoria, difficulty sleeping, mood swings and personality changes.  Premenopausal women may notice an irregularity in her menstrual cycle for 2-3 months following the injection.  Steroids can specifically affect patients with hypertension, diabetes, and peptic ulcers.  The procedure may cause a temporary increase in blood pressure and blood pressure, and may adversely affect a peptic ulcer.  Other, more rare complications, include avascular necrosis of joints, glaucoma and worsening of osteoporosis. \par \par I have discussed the risks of the procedure at length with the patient, and the potential benefits of pain relief.  I have offered alternatives to the procedure.  All questions were answered.  \par \par The patient expressed understanding and wishes to proceed with the procedure.\par \par 	>Regarding COVID19 Pandemic: \par \par Any planned interventional pain procedure are scheduled because further delay may cause harm or negative outcome to patient.  The goal in performing this procedure is to avoid deterioration of function, emergency room visits (which increases exposure) and reliance on opioids.  \par \par r/b/a discussed with patient, lack of evidence to conclusively determine whether pain management procedures have any positive or negative impact on the possibility of jojo the virus and/or development of any sequelae. \par \par Patient counselled regarding timing steroid based intervention 2 weeks before or after COVID-19 vaccine administration to avoid any interaction or affect on efficacy of vaccination\par \par Patient demonstrates understanding\par \par Informed patient that risks associated with the COVID-19 infection.  Informed patient steps taken to limit the risks.  We are implementing safety precautions and following protocols consistent with the CDC and state recommendations. All patients and staff will be checked for fever or signs of illness upon entry to the facility. We will limit our steroid dose to the lowest effective therapeutic dose or in some cases steroids will not be injected at all. \par \par Patient agrees to proceed\par \par >> Conclusion\par \par The above diagnosis and treatment plan is medically reasonable and necessary based on the patient encounter \par There were no barriers to communication.\par Informed patient that I would be available for any additional questions.\par Patient was instructed to call with any worsening symptoms including severe pain, new numbness/weakness, or changes in the bowel/bladder function. \par Discussed role of nsaids in pain management and all relevant risks, if patient is continuing to require after 4 weeks the patient should f/u for alternative treatment. \par Instructed patient to maintain pain diary to monitor pain level, mobility, and function.\par \par \par

## 2023-04-14 ENCOUNTER — APPOINTMENT (OUTPATIENT)
Dept: RHEUMATOLOGY | Facility: CLINIC | Age: 58
End: 2023-04-14
Payer: MEDICAID

## 2023-04-14 VITALS
WEIGHT: 169 LBS | SYSTOLIC BLOOD PRESSURE: 120 MMHG | HEIGHT: 63 IN | OXYGEN SATURATION: 98 % | DIASTOLIC BLOOD PRESSURE: 70 MMHG | HEART RATE: 82 BPM | BODY MASS INDEX: 29.95 KG/M2

## 2023-04-14 DIAGNOSIS — Z78.9 OTHER SPECIFIED HEALTH STATUS: ICD-10-CM

## 2023-04-14 DIAGNOSIS — R70.0 ELEVATED ERYTHROCYTE SEDIMENTATION RATE: ICD-10-CM

## 2023-04-14 PROCEDURE — 99205 OFFICE O/P NEW HI 60 MIN: CPT

## 2023-04-14 NOTE — REASON FOR VISIT
[Initial Eval - Existing Diagnosis] : an initial evaluation of an existing diagnosis [FreeTextEntry1] : Sclerosing mesenteritis

## 2023-04-14 NOTE — HISTORY OF PRESENT ILLNESS
[FreeTextEntry1] : Patient is referred for Dx of sclerosing mesenteritis with a persistently elevated ESR. There is continued upper abdominal pain. She received a trial of prednisone starting at 30 mg, and this was tapered off but no DMARD was started. Abdominal pain was not improved with prednisone 30 mg per patient. Her endocrinologist was concerned about the continued elevation of ESR and was referred. There has been no fever and no urinary symptoms. There are joint pains (left shoulder and knees) with significant AM stiffness.

## 2023-04-14 NOTE — PHYSICAL EXAM
[General Appearance - Alert] : alert [General Appearance - In No Acute Distress] : in no acute distress [General Appearance - Well Nourished] : well nourished [General Appearance - Well Developed] : well developed [Sclera] : the sclera and conjunctiva were normal [Auscultation Breath Sounds / Voice Sounds] : lungs were clear to auscultation bilaterally [Cervical Lymph Nodes Enlarged Posterior Bilaterally] : posterior cervical [Cervical Lymph Nodes Enlarged Anterior Bilaterally] : anterior cervical [Motor Exam] : the motor exam was normal [Abdomen Soft] : soft [] : no hepato-splenomegaly [Epigastric] : in the epigastric area [FreeTextEntry1] : There is minimal left elbow joint line tenderness without palpable swelling. There is pain on extreme rOM of the left wrist. There is minimal left knee joint line tenderness laterally without palpable swelling. There is no other active synovitis noted.

## 2023-04-14 NOTE — REVIEW OF SYSTEMS
[Joint Pain] : joint pain [Joint Swelling] : joint swelling [Joint Stiffness] : joint stiffness [Fever] : no fever [Chills] : no chills [Eye Pain] : no eye pain [Red Eyes] : eyes not red [Shortness Of Breath] : no shortness of breath [Cough] : no cough [Abdominal Pain] : no abdominal pain [Diarrhea] : no diarrhea [Dysuria] : no dysuria

## 2023-04-14 NOTE — END OF VISIT
[Time Spent: ___ minutes] : I have spent [unfilled] minutes of time on the encounter. Helical Rim Advancement Flap Text: The defect edges were debeveled with a #15 blade scalpel.  Given the location of the defect and the proximity to free margins (helical rim) a double helical rim advancement flap was deemed most appropriate.  Using a sterile surgical marker, the appropriate advancement flaps were drawn incorporating the defect and placing the expected incisions between the helical rim and antihelix where possible.  The area thus outlined was incised through and through with a #15 scalpel blade.  With a skin hook and iris scissors, the flaps were gently and sharply undermined and freed up.

## 2023-04-26 ENCOUNTER — APPOINTMENT (OUTPATIENT)
Dept: RHEUMATOLOGY | Facility: CLINIC | Age: 58
End: 2023-04-26
Payer: MEDICAID

## 2023-04-26 VITALS
OXYGEN SATURATION: 97 % | BODY MASS INDEX: 29.95 KG/M2 | WEIGHT: 169 LBS | SYSTOLIC BLOOD PRESSURE: 122 MMHG | DIASTOLIC BLOOD PRESSURE: 74 MMHG | HEIGHT: 63 IN | HEART RATE: 73 BPM

## 2023-04-26 DIAGNOSIS — M25.50 PAIN IN UNSPECIFIED JOINT: ICD-10-CM

## 2023-04-26 PROCEDURE — 99213 OFFICE O/P EST LOW 20 MIN: CPT

## 2023-04-26 NOTE — PHYSICAL EXAM
[General Appearance - Alert] : alert [General Appearance - In No Acute Distress] : in no acute distress [General Appearance - Well Nourished] : well nourished [General Appearance - Well Developed] : well developed [Sclera] : the sclera and conjunctiva were normal [] : no rash [Motor Exam] : the motor exam was normal [FreeTextEntry1] : There is minimal left elbow joint line tenderness without palpable swelling. There is pain on extreme rOM of the left wrist. There is minimal left knee joint line tenderness laterally without palpable swelling. There is no other active synovitis noted.

## 2023-04-26 NOTE — HISTORY OF PRESENT ILLNESS
[FreeTextEntry1] : Patient was very belligerent when I entered the room - "I am not taking the prednisone anymore - I feel dizzy and I get heart palpitations."  Has been taking prednisone 40 mg since the last visit, and reports no benefit from taking the prednisone - no relief of abdominal symptoms. In addition - "And I have an oncology appointment next month, and I ain't gonna go to him, either!  He's another waist of time!"

## 2023-05-19 ENCOUNTER — APPOINTMENT (OUTPATIENT)
Dept: HEMATOLOGY ONCOLOGY | Facility: CLINIC | Age: 58
End: 2023-05-19

## 2023-05-31 ENCOUNTER — RX RENEWAL (OUTPATIENT)
Age: 58
End: 2023-05-31

## 2023-06-02 LAB
ALBUMIN SERPL ELPH-MCNC: 4.4 G/DL
ALP BLD-CCNC: 62 U/L
ALT SERPL-CCNC: 18 U/L
ANION GAP SERPL CALC-SCNC: 11 MMOL/L
AST SERPL-CCNC: 23 U/L
BASOPHILS # BLD AUTO: 0.07 K/UL
BASOPHILS NFR BLD AUTO: 0.7 %
BILIRUB SERPL-MCNC: 0.5 MG/DL
BUN SERPL-MCNC: 9 MG/DL
CALCIUM SERPL-MCNC: 9.6 MG/DL
CHLORIDE SERPL-SCNC: 102 MMOL/L
CO2 SERPL-SCNC: 28 MMOL/L
CREAT SERPL-MCNC: 0.83 MG/DL
CRP SERPL-MCNC: 32 MG/L
EGFR: 82 ML/MIN/1.73M2
EOSINOPHIL # BLD AUTO: 0.39 K/UL
EOSINOPHIL NFR BLD AUTO: 3.8 %
ERYTHROCYTE [SEDIMENTATION RATE] IN BLOOD BY WESTERGREN METHOD: 98 MM/HR
GLUCOSE SERPL-MCNC: 112 MG/DL
HCT VFR BLD CALC: 41.7 %
HGB BLD-MCNC: 12.9 G/DL
IMM GRANULOCYTES NFR BLD AUTO: 0.4 %
LYMPHOCYTES # BLD AUTO: 2.28 K/UL
LYMPHOCYTES NFR BLD AUTO: 22.3 %
MAN DIFF?: NORMAL
MCHC RBC-ENTMCNC: 30.6 PG
MCHC RBC-ENTMCNC: 30.9 GM/DL
MCV RBC AUTO: 99 FL
MONOCYTES # BLD AUTO: 0.67 K/UL
MONOCYTES NFR BLD AUTO: 6.6 %
NEUTROPHILS # BLD AUTO: 6.76 K/UL
NEUTROPHILS NFR BLD AUTO: 66.2 %
PLATELET # BLD AUTO: 288 K/UL
POTASSIUM SERPL-SCNC: 4.1 MMOL/L
PROT SERPL-MCNC: 7.2 G/DL
RBC # BLD: 4.21 M/UL
RBC # FLD: 16.5 %
SODIUM SERPL-SCNC: 141 MMOL/L
WBC # FLD AUTO: 10.21 K/UL

## 2023-06-29 ENCOUNTER — RESULT REVIEW (OUTPATIENT)
Age: 58
End: 2023-06-29

## 2023-07-05 ENCOUNTER — RESULT REVIEW (OUTPATIENT)
Age: 58
End: 2023-07-05

## 2023-07-05 ENCOUNTER — APPOINTMENT (OUTPATIENT)
Dept: HEMATOLOGY ONCOLOGY | Facility: CLINIC | Age: 58
End: 2023-07-05
Payer: MEDICAID

## 2023-07-05 VITALS
WEIGHT: 167.13 LBS | TEMPERATURE: 96.1 F | SYSTOLIC BLOOD PRESSURE: 128 MMHG | DIASTOLIC BLOOD PRESSURE: 67 MMHG | HEIGHT: 63 IN | RESPIRATION RATE: 16 BRPM | OXYGEN SATURATION: 95 % | BODY MASS INDEX: 29.61 KG/M2 | HEART RATE: 70 BPM

## 2023-07-05 PROCEDURE — 99215 OFFICE O/P EST HI 40 MIN: CPT | Mod: 25

## 2023-07-05 NOTE — PHYSICAL EXAM
[Fully active, able to carry on all pre-disease performance without restriction] : Status 0 - Fully active, able to carry on all pre-disease performance without restriction [Normal] : affect appropriate [de-identified] : Around 9 mm x 3 mm tan rash left cheek with irregular borders, no obvious ulceration

## 2023-07-05 NOTE — ASSESSMENT
[FreeTextEntry1] : Left cheek melanoma\par measuring approximately 0.3 mm in thickness.  No ulceration.  1 mitosis/mm2.  No microsatellitosis, lymphovascular invasion\par Pathologic stage pT1a\par Discussed at length about the diagnosis, work up, staging, prognosis and treatment options\par Discussed about NCCN guidelines-given the lack of high risk findings, signs and symptoms-> no indication for imaging\par Refer to Dr. Twin Wilkinson (surgery) will likely need wide excision\par \par Sclerosing Mesenteritis\par Biopsy proven \par INtermittent abdominal pain\par Has received prednisone periodically\par Was briefly lost to follow up\par Seen today for follow up\par No abdominal pain\par No tenderness on palpation\par Explained that for recurrent pain we can consider tamoxifen+prednisone combination \par For lack of response- will consider pentoxifylline or azathioprine\par Advised to be uptodate with her age appropriate cancer screening which was discussed\par Will obtain CT chest abdomen and pelvis to assess the lung findings and the sclerosing mesenteritis\par May also help stage her melanoma [although no scans necessary for the melanoma standpoint]\par \par Social Hx\par Lives with her  in [place]\par Used to work in [employer], not current employed\par \par Patient had multiple questions which were answered to satisfaction\par \par Follow up in 4weeks\par No labs

## 2023-07-05 NOTE — HISTORY OF PRESENT ILLNESS
[de-identified] : 55 year old female presents today for initial consultation of  a questionable retroperitoneal mass and lung nodules.  She presented to ER in July with complaints of abdominal pain x 2 weeks, she was found to have infiltration of the retroperitoneal mesentery, extensive shotty adenopathy, 4cm soft tissue mass, and was admitted for further management to rule out malignancy.  General Surgeon Dr. Butler consulted and recommended IR guided biopsy.  Outpatient Pulmonologist Dr. Knox consulted.  Outpatient PET scan which showed no uptake in lungs or abdomen and has repeat CT Chest in September 2021.  No IR or surgical intervention recommended at this time.  \par \par PET SCAN 3/2021- No evidence of hypermetabolic focus in the lung parenchyma. Bilateral pulmonary opacities- F/u 6 month diagnostic CT chest is advised.  No evidence of hypermetabolic activity in the mediastinal and cervical nodes.  Diffuse hypermetabolic activity in the thyroid gland can be secondary to thyroiditis.  \par \par CT chest 9/10/2021- Multiple diverticuli in lower descending colon and sigmoid colon, without evidence of acute diverticulitis.  Multiple calculi in normal sized gallbladder, without additional CT signs of acute cholecystitis no evidence of biliary duct dilatation.  No change in hazy increased density in mesentery, multiple small mesenteric lymph nodes, or in\par lobulated left mesenteric mass that is partially calcified and measures approximately 4 x 2 x 3 cm, since prior CT of 7/8/2021.  No other evidence of acute abdominal or pelvic pathology.\par \par Colonoscopy done- Aug 26, 2021- pt reports WNL with Dr. Nunez\par \par Family Hx- MGM lung cancer (smoker) PGM metastatic breast cancer postmenopausal, father with colon cancer- alive, paternal cousin with colon cancer- alive \par Smoking Hx- smoked for 29 years, quit 16 years ago, smoked 2 PPD\par \par She was last seen by Dr Garay in Feb 2022 and Dr Fonseca (1/2022) and subsequently decided to stop following and then switched care\par  [de-identified] : Patient is seen today due to her new diagnosis of melanoma\par Accompanied by her daughter Yoli\par \par She has left cheek rash x 15 years, has gradually increased in size\par She has biopsy in 2021 which showed atypical junctional proliferation of melanocytes, was recommended excision. She had appt at that time but could not drive and was lost to follow up\par The rash was increasing in size-> so HELENE Rodriguez ([facility] [place]) \par Status post biopsy\par Path:\par Malignant melanoma measuring approximately 0.3 mm in thickness.  No ulceration.  1 mitosis/mm2.  No microsatellitosis, lymphovascular invasion\par Pathologic stage pT1a\par \par Abdominal pain happens intermittently - mostly around the umbilical area. Now happens less frequently and is "less bad"\par She has not been on prednisone for a while as she cannot tolerate\par \par Quit smoking 16 years ago. On Lung cancer screening program

## 2023-07-07 ENCOUNTER — APPOINTMENT (OUTPATIENT)
Dept: PAIN MANAGEMENT | Facility: CLINIC | Age: 58
End: 2023-07-07
Payer: MEDICAID

## 2023-07-07 VITALS
DIASTOLIC BLOOD PRESSURE: 75 MMHG | BODY MASS INDEX: 29.59 KG/M2 | WEIGHT: 167 LBS | HEIGHT: 63 IN | SYSTOLIC BLOOD PRESSURE: 121 MMHG

## 2023-07-07 PROCEDURE — 99214 OFFICE O/P EST MOD 30 MIN: CPT

## 2023-07-07 NOTE — ASSESSMENT
[FreeTextEntry1] : >> Imaging and Other Studies\par \par I personally reviewed the relevant imaging.  Discussed and explained to patient the likely source of pathology and pain.  Questions answered. MRI \par \par >> Therapy and Other Modalities\par \par continue home exercises\par \par >> Medications\par \par \par acetaminophen 650mg q8h prn pain (caution <3g daily)\par  \par >> Interventions\par \par Significant component of axial back pain secondary to lumbar spondylosis and facet arthropathy demonstrated on MRI LS.  Pain refractory to conservative treatments.  sp BILATERAL L4-sacral ala diagnostic medial branch block (2 joints, 3 nerves on each side) \par \par Significant component of back and leg pain likely secondary to lumbar spinal stenosis demonstrated on MRI LS.  sp  L4-5 interlaminar epidural steroid injection wish significant improvement\par \par may consider repeat intervention after completion of melanoma workup\par \par >> Consults\par \par fu with heme onc\par fu with GI - rx sent from last visit\par \par >> Discussion of Risks/Benefits/Alternatives\par \par 	>Regarding any scheduled procedures:\par \par I have discussed in detail with the patient that any interventional pain procedure is associated with potential risks.  The procedure may include an injection of steroids and potentially other medications (local anesthetic and normal saline) into the epidural space or surrounding tissue of the spine.  There are significant risks of this procedure which include and are not limited to infection, bleeding, worsening pain, dural puncture leading to postdural puncture headache, nerve damage, spinal cord injury, paralysis, stroke, and death.  \par \par There is a chance that the procedure does not improve their pain.  \par \par There are risks associated with the steroid being absorbed into the body systemically.  These include dysphoria, difficulty sleeping, mood swings and personality changes.  Premenopausal women may notice an irregularity in her menstrual cycle for 2-3 months following the injection.  Steroids can specifically affect patients with hypertension, diabetes, and peptic ulcers.  The procedure may cause a temporary increase in blood pressure and blood pressure, and may adversely affect a peptic ulcer.  Other, more rare complications, include avascular necrosis of joints, glaucoma and worsening of osteoporosis. \par \par I have discussed the risks of the procedure at length with the patient, and the potential benefits of pain relief.  I have offered alternatives to the procedure.  All questions were answered.  \par \par The patient expressed understanding and wishes to proceed with the procedure.\par \par 	>Regarding COVID19 Pandemic: \par \par Any planned interventional pain procedure are scheduled because further delay may cause harm or negative outcome to patient.  The goal in performing this procedure is to avoid deterioration of function, emergency room visits (which increases exposure) and reliance on opioids.  \par \par r/b/a discussed with patient, lack of evidence to conclusively determine whether pain management procedures have any positive or negative impact on the possibility of jojo the virus and/or development of any sequelae. \par \par Patient counselled regarding timing steroid based intervention 2 weeks before or after COVID-19 vaccine administration to avoid any interaction or affect on efficacy of vaccination\par \par Patient demonstrates understanding\par \par Informed patient that risks associated with the COVID-19 infection.  Informed patient steps taken to limit the risks.  We are implementing safety precautions and following protocols consistent with the CDC and state recommendations. All patients and staff will be checked for fever or signs of illness upon entry to the facility. We will limit our steroid dose to the lowest effective therapeutic dose or in some cases steroids will not be injected at all. \par \par Patient agrees to proceed\par \par >> Conclusion\par \par The above diagnosis and treatment plan is medically reasonable and necessary based on the patient encounter \par There were no barriers to communication.\par Informed patient that I would be available for any additional questions.\par Patient was instructed to call with any worsening symptoms including severe pain, new numbness/weakness, or changes in the bowel/bladder function. \par Discussed role of nsaids in pain management and all relevant risks, if patient is continuing to require after 4 weeks the patient should f/u for alternative treatment. \par Instructed patient to maintain pain diary to monitor pain level, mobility, and function.\par \par \par

## 2023-07-07 NOTE — HISTORY OF PRESENT ILLNESS
[3] : 3. What number best describes how, during the past week, pain has interfered with your general activity? 3/10 pain [___ yrs] : [unfilled] year(s) ago [Constant] : constant [9] : a minimum pain level of 9/10 [10] : a maximum pain level of 10/10 [Aching] : aching [Throbbing] : throbbing [Bending] : bending [Heat] : heat [Ice] : ice [FreeTextEntry1] : Interval Note:\par \par Patient reports return of back pain that is affecting her sleep at times and adls.  Of note, she was recently diagnosed with melanoma pending workup. . denies any worsening numbness, weakness, bowel/bladder dysfunction.  \par \par \par \par HPI\par \par  \par \par Ms. JAG TALBERT is a 56 year F with recent diagnosis of melanoma,  sclerosing mesenteritis (off steroids) referred by rheumatologist for pain management. Pain to periumbilical area. In addition c/o of axial lower back and bilateral groin pain x 10 yrs. Worsening throughout the day. Difficulty to walk and complete ADL's. Denies any additional weakness, numbness, bowel/bladder dysfunction.\par \par \par \par Previous and current pain medications/doses/effects: Aspercreme, Gabapentin, Tylenol\par \par  \par \par Previous Pain Treatments: Physcial Therapy ~2 yrs ago\par \par  \par \par Previous Pain Injections: \par \par \par  L4-5 interlaminar epidural steroid injection 11/22\par bilateral L4-sacral ala medial branch block without significant improvement\par \par  \par \par Previous Diagnostic Studies/Images: \par \par MRI of lumbar spine without contrast 4/5/2022\par \par CLINICAL INFORMATION:\par Lumber radiculopathy\par TECHNIQUE:\par T1 and T2-weighted sagittal and axial images of lumbar spine are obtained.\par COMPARISON:\par MRI lumbar spine performed on 03/14/2014.\par FINDINGS:\par The vertebral height, bone marrow signal and bony alignment of lumbar spine are unremarkable\par Desiccation of intervertebral disc spaces is noted throughout the lumber spine due to degenerative\par disc changes. The conus is visualized at L1 level with normal morphology and signal intensity.\par L2-3 and L3-4 levels demonstrate mild bulging annulus indenting the ventral dural sac without\par significant spinal stenosis.\par At L4-5 level, there is broad-based central disc herniation indenting the ventral dural sac without\par significant spinal stenosis.\par Other levels of lumbar spine demonstrate no significant disk herniation or spinal stenosis.\par Impression:\par Broad-based mild central disc herniation is noted at L4-5 level without significant spinal stenosis.\par When compared to prior MRI exarination lumbar spine performed on 03/14/2014, no significant\par interval change is noted.\par Electronically Signed By: Art Angeles on April 05, 2022 08:34 PM\par \par \par  \par \par \par  [FreeTextEntry2] : 9 [FreeTextEntry7] : Back Pain  [FreeTextEntry4] : PT

## 2023-07-10 ENCOUNTER — NON-APPOINTMENT (OUTPATIENT)
Age: 58
End: 2023-07-10

## 2023-07-10 ENCOUNTER — APPOINTMENT (OUTPATIENT)
Dept: BREAST CENTER | Facility: CLINIC | Age: 58
End: 2023-07-10
Payer: MEDICAID

## 2023-07-10 VITALS
HEIGHT: 63 IN | OXYGEN SATURATION: 97 % | WEIGHT: 167 LBS | BODY MASS INDEX: 29.59 KG/M2 | DIASTOLIC BLOOD PRESSURE: 81 MMHG | HEART RATE: 89 BPM | SYSTOLIC BLOOD PRESSURE: 133 MMHG

## 2023-07-10 PROCEDURE — 99203 OFFICE O/P NEW LOW 30 MIN: CPT

## 2023-07-11 NOTE — PHYSICAL EXAM
[No Supraclavicular Adenopathy] : no supraclavicular adenopathy [No Cervical Adenopathy] : no cervical adenopathy [Clear to Auscultation Bilat] : clear to auscultation bilaterally [No Axillary Lymphadenopathy] : no left axillary lymphadenopathy [de-identified] : In the left upper cheek approximately 2 cm from the eye is a light brown pigmented lesion measuring 1.8 cm in diameter with no associated ulceration or satellite lesions.

## 2023-07-11 NOTE — HISTORY OF PRESENT ILLNESS
[FreeTextEntry1] : 57-year-old woman has had a left cheek pigmented lesion for the last 10 years which has increased in size for the last 2 years.  Patient underwent a shave biopsy which revealed a 0.3 mm Umair level III melanoma without ulceration with a low mitotic index, pT1a.  Patient has not had any melanomas or other skin cancers and has no family history of melanoma although she has a family history of colon and breast cancer.  Patient has no other symptoms.

## 2023-07-18 ENCOUNTER — APPOINTMENT (OUTPATIENT)
Dept: PLASTIC SURGERY | Facility: CLINIC | Age: 58
End: 2023-07-18
Payer: MEDICAID

## 2023-07-18 ENCOUNTER — NON-APPOINTMENT (OUTPATIENT)
Age: 58
End: 2023-07-18

## 2023-07-18 VITALS — BODY MASS INDEX: 29.59 KG/M2 | WEIGHT: 167 LBS | HEIGHT: 63 IN

## 2023-07-18 PROCEDURE — 99203 OFFICE O/P NEW LOW 30 MIN: CPT

## 2023-07-18 NOTE — HISTORY OF PRESENT ILLNESS
[FreeTextEntry1] : 56 y/o female presents for initial consultation referred by Dr. Wilkinson for Melanoma on the left cheek.\par Lesion measures ~1x2 cm. With 1 cm margin resection is likely to result in a ~3x4 cm defect.\par today we reviewed options for closing such a defect. Anticipate using a fascicutaneous cervicofacial flap to close this defect.\par NAture of surgery, resulting scar, risks, benefits, alternatives, expected postoperative course, recovery and long term results were reviewed. All questions answered.\par Will coordinate for the near future.

## 2023-07-25 ENCOUNTER — NON-APPOINTMENT (OUTPATIENT)
Age: 58
End: 2023-07-25

## 2023-08-03 ENCOUNTER — RESULT REVIEW (OUTPATIENT)
Age: 58
End: 2023-08-03

## 2023-08-11 ENCOUNTER — APPOINTMENT (OUTPATIENT)
Dept: PAIN MANAGEMENT | Facility: CLINIC | Age: 58
End: 2023-08-11
Payer: MEDICAID

## 2023-08-11 VITALS
HEART RATE: 66 BPM | DIASTOLIC BLOOD PRESSURE: 67 MMHG | WEIGHT: 167 LBS | SYSTOLIC BLOOD PRESSURE: 101 MMHG | BODY MASS INDEX: 29.59 KG/M2 | OXYGEN SATURATION: 92 % | HEIGHT: 63 IN

## 2023-08-11 PROCEDURE — 99214 OFFICE O/P EST MOD 30 MIN: CPT

## 2023-08-11 NOTE — HISTORY OF PRESENT ILLNESS
[___ yrs] : [unfilled] year(s) ago [Constant] : constant [9] : a minimum pain level of 9/10 [10] : a maximum pain level of 10/10 [Aching] : aching [Throbbing] : throbbing [Bending] : bending [Heat] : heat [Ice] : ice [FreeTextEntry1] : Interval Note:  Patient is pending melanoma resection 8/31/23.  Continues to have back and leg pain as well as neck pain radiating to left arm.  Pain is so bad that patient finds it difficult to perform adls and ambulate.   denies any worsening numbness, weakness, bowel/bladder dysfunction.      HPI     Ms. JAG TALBERT is a 56 year F with recent diagnosis of melanoma,  sclerosing mesenteritis (off steroids) referred by rheumatologist for pain management. Pain to periumbilical area. In addition c/o of axial lower back and bilateral groin pain x 10 yrs. Worsening throughout the day. Difficulty to walk and complete ADL's. Denies any additional weakness, numbness, bowel/bladder dysfunction.    Previous and current pain medications/doses/effects: Aspercreme, Gabapentin, Tylenol     Previous Pain Treatments: Physcial Therapy ~2 yrs ago     Previous Pain Injections:     L4-5 interlaminar epidural steroid injection 11/22 bilateral L4-sacral ala medial branch block without significant improvement     Previous Diagnostic Studies/Images:   MRI of lumbar spine without contrast 4/5/2022  CLINICAL INFORMATION: Lumber radiculopathy TECHNIQUE: T1 and T2-weighted sagittal and axial images of lumbar spine are obtained. COMPARISON: MRI lumbar spine performed on 03/14/2014. FINDINGS: The vertebral height, bone marrow signal and bony alignment of lumbar spine are unremarkable Desiccation of intervertebral disc spaces is noted throughout the lumber spine due to degenerative disc changes. The conus is visualized at L1 level with normal morphology and signal intensity. L2-3 and L3-4 levels demonstrate mild bulging annulus indenting the ventral dural sac without significant spinal stenosis. At L4-5 level, there is broad-based central disc herniation indenting the ventral dural sac without significant spinal stenosis. Other levels of lumbar spine demonstrate no significant disk herniation or spinal stenosis. Impression: Broad-based mild central disc herniation is noted at L4-5 level without significant spinal stenosis. When compared to prior MRI exarination lumbar spine performed on 03/14/2014, no significant interval change is noted. Electronically Signed By: Art Angeles on April 05, 2022 08:34 PM        [FreeTextEntry7] : Back Pain  [FreeTextEntry4] : PT

## 2023-08-11 NOTE — PHYSICAL EXAM
[Normal muscle bulk without asymmetry] : normal muscle bulk without asymmetry [Facet Tenderness] : facet tenderness [Spurling] : positive Spurling's Test [Normal] : Normal affect

## 2023-08-11 NOTE — ASSESSMENT
[FreeTextEntry1] : >> Imaging and Other Studies  I personally reviewed the relevant imaging.  Discussed and explained to patient the likely source of pathology and pain.  Questions answered. MRI   Neck and arm pain likely secondary to cervical radiculopathy and discogenic pain vs spondylosis refractory to conservative treatments including 6 consecutive weeks of PT/home exercises, will obtain MRI CS w wo IVC given active melanoma dx to evaluate for pathology  may consider PT vs intervention pending eval  >> Therapy and Other Modalities  continue home exercises  >> Medications   acetaminophen 650mg q8h prn pain (caution <3g daily)   >> Interventions  Significant component of axial back pain secondary to lumbar spondylosis and facet arthropathy demonstrated on MRI LS.  Pain refractory to conservative treatments.  sp BILATERAL L4-sacral ala diagnostic medial branch block (2 joints, 3 nerves on each side)   Significant component of back and leg pain likely secondary to lumbar spinal stenosis demonstrated on MRI LS.  sp  L4-5 interlaminar epidural steroid injection wish significant improvement  given efficacy of previous intervention that is >80% improvement in pain and improved ability to perform adls, and return of pain despite conservative treatment, will schedule repeat L4-5 interlaminar epidural steroid injection r/b/a discussed pending melanoma treatment  >> Consults  fu with heme onc fu with GI - rx sent from last visit  >> Discussion of Risks/Benefits/Alternatives  	>Regarding any scheduled procedures:  I have discussed in detail with the patient that any interventional pain procedure is associated with potential risks.  The procedure may include an injection of steroids and potentially other medications (local anesthetic and normal saline) into the epidural space or surrounding tissue of the spine.  There are significant risks of this procedure which include and are not limited to infection, bleeding, worsening pain, dural puncture leading to postdural puncture headache, nerve damage, spinal cord injury, paralysis, stroke, and death.    There is a chance that the procedure does not improve their pain.    There are risks associated with the steroid being absorbed into the body systemically.  These include dysphoria, difficulty sleeping, mood swings and personality changes.  Premenopausal women may notice an irregularity in her menstrual cycle for 2-3 months following the injection.  Steroids can specifically affect patients with hypertension, diabetes, and peptic ulcers.  The procedure may cause a temporary increase in blood pressure and blood pressure, and may adversely affect a peptic ulcer.  Other, more rare complications, include avascular necrosis of joints, glaucoma and worsening of osteoporosis.   I have discussed the risks of the procedure at length with the patient, and the potential benefits of pain relief.  I have offered alternatives to the procedure.  All questions were answered.    The patient expressed understanding and wishes to proceed with the procedure.  	>Regarding COVID19 Pandemic:   Any planned interventional pain procedure are scheduled because further delay may cause harm or negative outcome to patient.  The goal in performing this procedure is to avoid deterioration of function, emergency room visits (which increases exposure) and reliance on opioids.    r/b/a discussed with patient, lack of evidence to conclusively determine whether pain management procedures have any positive or negative impact on the possibility of jojo the virus and/or development of any sequelae.   Patient counselled regarding timing steroid based intervention 2 weeks before or after COVID-19 vaccine administration to avoid any interaction or affect on efficacy of vaccination  Patient demonstrates understanding  Informed patient that risks associated with the COVID-19 infection.  Informed patient steps taken to limit the risks.  We are implementing safety precautions and following protocols consistent with the CDC and state recommendations. All patients and staff will be checked for fever or signs of illness upon entry to the facility. We will limit our steroid dose to the lowest effective therapeutic dose or in some cases steroids will not be injected at all.   Patient agrees to proceed  >> Conclusion  The above diagnosis and treatment plan is medically reasonable and necessary based on the patient encounter  There were no barriers to communication. Informed patient that I would be available for any additional questions. Patient was instructed to call with any worsening symptoms including severe pain, new numbness/weakness, or changes in the bowel/bladder function.  Discussed role of nsaids in pain management and all relevant risks, if patient is continuing to require after 4 weeks the patient should f/u for alternative treatment.  Instructed patient to maintain pain diary to monitor pain level, mobility, and function.

## 2023-08-25 ENCOUNTER — APPOINTMENT (OUTPATIENT)
Dept: PLASTIC SURGERY | Facility: CLINIC | Age: 58
End: 2023-08-25
Payer: MEDICAID

## 2023-08-25 PROCEDURE — ZZZZZ: CPT

## 2023-08-25 NOTE — HISTORY OF PRESENT ILLNESS
[FreeTextEntry1] : Patient name: JAG TALBERT  : 1965  Date:2023  Attending: Dr. Alcaraz   HPI: Patient presents for preop visit for flap closure of left cheek SP excision of melanoma with Dr. Wilkinson 23   Allergies: Aleve, Flagyl, Lexapro, Sulfa, Synthroid and Prednisone  Medications Prescribed: Percocet   ROS: Complete 14 point review of systems negative except pertinent items reviewed in the HPI. Other Non-contributory items reviewed in our new patient questionnaire and we have submitted it to be scanned into the medical record.  PE: Completed at time of in office evaluation  Assessment/Plan: We have discussed pre and post operative course including but not limited to instructions, recovery limitations and expectations, NPO status, transportation home, post op medications and pain management, benefits and risks of the surgical procedure.  They patient would like to proceed with surgery as discussed and planned.   Erica Jama NP

## 2023-08-25 NOTE — REASON FOR VISIT
[Home] : at home, [unfilled] , at the time of the visit. [Medical Office: ([facility])___] : at the medical office located in  [Patient] : the patient

## 2023-08-30 ENCOUNTER — RESULT REVIEW (OUTPATIENT)
Age: 58
End: 2023-08-30

## 2023-08-31 ENCOUNTER — APPOINTMENT (OUTPATIENT)
Dept: BREAST CENTER | Facility: HOSPITAL | Age: 58
End: 2023-08-31

## 2023-08-31 ENCOUNTER — APPOINTMENT (OUTPATIENT)
Dept: PLASTIC SURGERY | Facility: HOSPITAL | Age: 58
End: 2023-08-31

## 2023-09-02 ENCOUNTER — RESULT REVIEW (OUTPATIENT)
Age: 58
End: 2023-09-02

## 2023-09-05 ENCOUNTER — TRANSCRIPTION ENCOUNTER (OUTPATIENT)
Age: 58
End: 2023-09-05

## 2023-09-06 ENCOUNTER — APPOINTMENT (OUTPATIENT)
Dept: PLASTIC SURGERY | Facility: CLINIC | Age: 58
End: 2023-09-06
Payer: MEDICAID

## 2023-09-06 PROCEDURE — 99024 POSTOP FOLLOW-UP VISIT: CPT

## 2023-09-06 NOTE — HISTORY OF PRESENT ILLNESS
[FreeTextEntry1] : 58 y/o female presents 6 days s/p flap closure of left cheek SP excision of melanoma with Dr. Wilkinson 8/31/23. Denies any f/c/n/v. Patient is taking Tylenol for pain. No complains offered. Presents for post op visit and suture removal.  PE: Incisions C/D/I. Skin edges well approximated. No collection or infection. No wound breakdown  A/P: Sutures removed without complication. Steri strips applied.  PO instructions reviewed. Answered all questions to full understanding. Discussed scar management with silicone scar gel.  RTC 3-4 months

## 2023-09-08 ENCOUNTER — APPOINTMENT (OUTPATIENT)
Dept: PAIN MANAGEMENT | Facility: CLINIC | Age: 58
End: 2023-09-08
Payer: MEDICAID

## 2023-09-08 VITALS
OXYGEN SATURATION: 97 % | HEART RATE: 67 BPM | DIASTOLIC BLOOD PRESSURE: 77 MMHG | SYSTOLIC BLOOD PRESSURE: 117 MMHG | RESPIRATION RATE: 15 BRPM

## 2023-09-08 PROCEDURE — 62323 NJX INTERLAMINAR LMBR/SAC: CPT

## 2023-09-08 NOTE — PROCEDURE
[FreeTextEntry1] : INTERLAMINAR EPIDURAL STEROID INJECTION  The patient was placed in the prone position on the fluoroscopic table with a pillow under the abdomen.  Routine monitors were applied.  A surgical timeout was performed.  The back was prepped and draped in the usual sterile fashion and sterile technique was adhered to throughout the entire procedure.  The [L4-5] was identified under fluroscopic guidance.  The vertebral body end plates were aligned and the spinous process was midline between the lateral processes.  The skin and subcutaneous tissues were infiltrated with [1% Lidocaine].  After adequate local anesthesia a ["20g Tuohy"] needle was inserted at [L4-5]   and aimed towards the affected side.    Using a loss of resistance to air technique the epidural space was identified.  After negative aspiration for heme and CSF, 1cc Omnipaque N180 contrast dye was injected.  Epidural spread of contrast was confirmed in the AP and lateral views.  The patient was injected with a mixture of 80mg kenalog with 1cc lidocaine 1% and 2cc of PF normal saline in incremental amounts.  The patient tolerated the procedure well.  There was no neurological deficit post procedure.  The patient went to recovery room in stable condition.  Discharge instructions were given to the patient.

## 2023-09-11 ENCOUNTER — APPOINTMENT (OUTPATIENT)
Dept: BREAST CENTER | Facility: CLINIC | Age: 58
End: 2023-09-11
Payer: MEDICAID

## 2023-09-11 VITALS — HEIGHT: 63 IN | WEIGHT: 167 LBS | BODY MASS INDEX: 29.59 KG/M2

## 2023-09-11 PROCEDURE — 99024 POSTOP FOLLOW-UP VISIT: CPT

## 2023-09-20 ENCOUNTER — APPOINTMENT (OUTPATIENT)
Dept: HEMATOLOGY ONCOLOGY | Facility: CLINIC | Age: 58
End: 2023-09-20
Payer: MEDICAID

## 2023-09-20 VITALS
HEART RATE: 62 BPM | HEIGHT: 63 IN | OXYGEN SATURATION: 97 % | DIASTOLIC BLOOD PRESSURE: 70 MMHG | RESPIRATION RATE: 16 BRPM | TEMPERATURE: 97.2 F | BODY MASS INDEX: 30.79 KG/M2 | WEIGHT: 173.8 LBS | SYSTOLIC BLOOD PRESSURE: 140 MMHG

## 2023-09-20 DIAGNOSIS — R91.8 OTHER NONSPECIFIC ABNORMAL FINDING OF LUNG FIELD: ICD-10-CM

## 2023-09-20 PROCEDURE — 99214 OFFICE O/P EST MOD 30 MIN: CPT | Mod: 25

## 2023-09-20 RX ORDER — OXYCODONE AND ACETAMINOPHEN 5; 325 MG/1; MG/1
5-325 TABLET ORAL
Qty: 20 | Refills: 0 | Status: COMPLETED | COMMUNITY
Start: 2023-08-25 | End: 2023-09-20

## 2023-09-20 RX ORDER — PREDNISONE 20 MG/1
20 TABLET ORAL DAILY
Qty: 60 | Refills: 1 | Status: COMPLETED | COMMUNITY
Start: 2023-04-14 | End: 2023-09-20

## 2023-09-20 RX ORDER — DULOXETINE HYDROCHLORIDE 30 MG/1
30 CAPSULE, DELAYED RELEASE PELLETS ORAL
Qty: 30 | Refills: 1 | Status: COMPLETED | COMMUNITY
Start: 2023-03-02 | End: 2023-09-20

## 2023-09-21 ENCOUNTER — RESULT REVIEW (OUTPATIENT)
Age: 58
End: 2023-09-21

## 2023-09-21 ENCOUNTER — APPOINTMENT (OUTPATIENT)
Dept: BREAST CENTER | Facility: CLINIC | Age: 58
End: 2023-09-21
Payer: MEDICAID

## 2023-09-21 VITALS — WEIGHT: 173 LBS | BODY MASS INDEX: 30.65 KG/M2 | HEIGHT: 63 IN

## 2023-09-21 PROCEDURE — 99213 OFFICE O/P EST LOW 20 MIN: CPT

## 2023-09-26 ENCOUNTER — APPOINTMENT (OUTPATIENT)
Dept: FAMILY MEDICINE | Facility: CLINIC | Age: 58
End: 2023-09-26
Payer: MEDICAID

## 2023-09-26 VITALS
SYSTOLIC BLOOD PRESSURE: 120 MMHG | BODY MASS INDEX: 30.65 KG/M2 | DIASTOLIC BLOOD PRESSURE: 70 MMHG | RESPIRATION RATE: 16 BRPM | HEART RATE: 62 BPM | TEMPERATURE: 98 F | OXYGEN SATURATION: 98 % | WEIGHT: 173 LBS | HEIGHT: 63 IN

## 2023-09-26 DIAGNOSIS — G47.8 OTHER SLEEP DISORDERS: ICD-10-CM

## 2023-09-26 DIAGNOSIS — M54.12 RADICULOPATHY, CERVICAL REGION: ICD-10-CM

## 2023-09-26 DIAGNOSIS — Z00.00 ENCOUNTER FOR GENERAL ADULT MEDICAL EXAMINATION W/OUT ABNORMAL FINDINGS: ICD-10-CM

## 2023-09-26 DIAGNOSIS — Z23 ENCOUNTER FOR IMMUNIZATION: ICD-10-CM

## 2023-09-26 PROCEDURE — 90686 IIV4 VACC NO PRSV 0.5 ML IM: CPT

## 2023-09-26 PROCEDURE — 99396 PREV VISIT EST AGE 40-64: CPT | Mod: 25

## 2023-09-26 PROCEDURE — 99213 OFFICE O/P EST LOW 20 MIN: CPT | Mod: 25

## 2023-09-26 PROCEDURE — G0008: CPT

## 2023-09-26 PROCEDURE — G0444 DEPRESSION SCREEN ANNUAL: CPT | Mod: 59

## 2023-09-27 ENCOUNTER — APPOINTMENT (OUTPATIENT)
Dept: PAIN MANAGEMENT | Facility: CLINIC | Age: 58
End: 2023-09-27
Payer: MEDICAID

## 2023-09-27 VITALS
DIASTOLIC BLOOD PRESSURE: 78 MMHG | OXYGEN SATURATION: 97 % | BODY MASS INDEX: 30.65 KG/M2 | SYSTOLIC BLOOD PRESSURE: 138 MMHG | HEIGHT: 63 IN | WEIGHT: 173 LBS | HEART RATE: 64 BPM

## 2023-09-27 DIAGNOSIS — G89.4 CHRONIC PAIN SYNDROME: ICD-10-CM

## 2023-09-27 DIAGNOSIS — M51.26 OTHER INTERVERTEBRAL DISC DISPLACEMENT, LUMBAR REGION: ICD-10-CM

## 2023-09-27 DIAGNOSIS — M47.817 SPONDYLOSIS W/OUT MYELOPATHY OR RADICULOPATHY, LUMBOSACRAL REGION: ICD-10-CM

## 2023-09-27 DIAGNOSIS — M79.18 MYALGIA, OTHER SITE: ICD-10-CM

## 2023-09-27 DIAGNOSIS — M54.16 RADICULOPATHY, LUMBAR REGION: ICD-10-CM

## 2023-09-27 PROBLEM — M54.12 CERVICAL RADICULOPATHY: Status: ACTIVE | Noted: 2023-08-11

## 2023-09-27 PROBLEM — G47.8 POOR SLEEP PATTERN: Status: ACTIVE | Noted: 2021-05-07

## 2023-09-27 PROCEDURE — 99214 OFFICE O/P EST MOD 30 MIN: CPT

## 2023-09-27 RX ORDER — TIZANIDINE 2 MG/1
2 TABLET ORAL
Qty: 45 | Refills: 1 | Status: ACTIVE | COMMUNITY
Start: 2023-09-27 | End: 1900-01-01

## 2023-09-28 ENCOUNTER — APPOINTMENT (OUTPATIENT)
Dept: PLASTIC SURGERY | Facility: CLINIC | Age: 58
End: 2023-09-28
Payer: MEDICAID

## 2023-09-28 LAB
25(OH)D3 SERPL-MCNC: 27.6 NG/ML
ALBUMIN SERPL ELPH-MCNC: 4.4 G/DL
ALP BLD-CCNC: 58 U/L
ALT SERPL-CCNC: 18 U/L
ANION GAP SERPL CALC-SCNC: 10 MMOL/L
APPEARANCE: CLEAR
AST SERPL-CCNC: 19 U/L
BACTERIA: NEGATIVE /HPF
BILIRUB SERPL-MCNC: 0.4 MG/DL
BILIRUBIN URINE: NEGATIVE
BLOOD URINE: NEGATIVE
BUN SERPL-MCNC: 19 MG/DL
CALCIUM SERPL-MCNC: 9.8 MG/DL
CAST: 0 /LPF
CHLORIDE SERPL-SCNC: 103 MMOL/L
CHOLEST SERPL-MCNC: 228 MG/DL
CO2 SERPL-SCNC: 27 MMOL/L
COLOR: YELLOW
CREAT SERPL-MCNC: 0.83 MG/DL
EGFR: 81 ML/MIN/1.73M2
EPITHELIAL CELLS: 3 /HPF
ESTIMATED AVERAGE GLUCOSE: 134 MG/DL
GLUCOSE QUALITATIVE U: NEGATIVE MG/DL
GLUCOSE SERPL-MCNC: 112 MG/DL
HBA1C MFR BLD HPLC: 6.3 %
HCT VFR BLD CALC: 41 %
HDLC SERPL-MCNC: 74 MG/DL
HGB BLD-MCNC: 13.1 G/DL
KETONES URINE: NEGATIVE MG/DL
LDLC SERPL CALC-MCNC: 131 MG/DL
LEUKOCYTE ESTERASE URINE: ABNORMAL
MCHC RBC-ENTMCNC: 30.6 PG
MCHC RBC-ENTMCNC: 32 GM/DL
MCV RBC AUTO: 95.8 FL
MICROSCOPIC-UA: NORMAL
NITRITE URINE: NEGATIVE
NONHDLC SERPL-MCNC: 155 MG/DL
PH URINE: 5.5
PLATELET # BLD AUTO: 310 K/UL
POTASSIUM SERPL-SCNC: 4.5 MMOL/L
PROT SERPL-MCNC: 7.2 G/DL
PROTEIN URINE: NEGATIVE MG/DL
RBC # BLD: 4.28 M/UL
RBC # FLD: 15.1 %
RED BLOOD CELLS URINE: 1 /HPF
SODIUM SERPL-SCNC: 140 MMOL/L
SPECIFIC GRAVITY URINE: 1.02
T4 FREE SERPL-MCNC: 1.3 NG/DL
TRIGL SERPL-MCNC: 137 MG/DL
TSH SERPL-ACNC: 7.76 UIU/ML
UROBILINOGEN URINE: 0.2 MG/DL
WBC # FLD AUTO: 12.01 K/UL
WHITE BLOOD CELLS URINE: 3 /HPF

## 2023-09-28 PROCEDURE — ZZZZZ: CPT

## 2023-10-02 LAB — BACTERIA UR CULT: NORMAL

## 2023-10-04 ENCOUNTER — RESULT REVIEW (OUTPATIENT)
Age: 58
End: 2023-10-04

## 2023-10-04 ENCOUNTER — NON-APPOINTMENT (OUTPATIENT)
Age: 58
End: 2023-10-04

## 2023-10-05 ENCOUNTER — APPOINTMENT (OUTPATIENT)
Dept: BREAST CENTER | Facility: HOSPITAL | Age: 58
End: 2023-10-05

## 2023-10-05 ENCOUNTER — APPOINTMENT (OUTPATIENT)
Dept: PLASTIC SURGERY | Facility: HOSPITAL | Age: 58
End: 2023-10-05

## 2023-10-05 ENCOUNTER — RESULT REVIEW (OUTPATIENT)
Age: 58
End: 2023-10-05

## 2023-10-06 ENCOUNTER — TRANSCRIPTION ENCOUNTER (OUTPATIENT)
Age: 58
End: 2023-10-06

## 2023-10-10 ENCOUNTER — APPOINTMENT (OUTPATIENT)
Dept: PLASTIC SURGERY | Facility: CLINIC | Age: 58
End: 2023-10-10

## 2023-10-12 ENCOUNTER — RESULT REVIEW (OUTPATIENT)
Age: 58
End: 2023-10-12

## 2023-10-13 ENCOUNTER — APPOINTMENT (OUTPATIENT)
Dept: BREAST CENTER | Facility: CLINIC | Age: 58
End: 2023-10-13
Payer: MEDICAID

## 2023-10-13 ENCOUNTER — APPOINTMENT (OUTPATIENT)
Dept: PULMONOLOGY | Facility: CLINIC | Age: 58
End: 2023-10-13
Payer: MEDICAID

## 2023-10-13 VITALS
DIASTOLIC BLOOD PRESSURE: 74 MMHG | OXYGEN SATURATION: 99 % | HEIGHT: 63 IN | SYSTOLIC BLOOD PRESSURE: 140 MMHG | WEIGHT: 173 LBS | TEMPERATURE: 96.7 F | HEART RATE: 69 BPM | BODY MASS INDEX: 30.65 KG/M2

## 2023-10-13 VITALS
SYSTOLIC BLOOD PRESSURE: 142 MMHG | BODY MASS INDEX: 30.65 KG/M2 | TEMPERATURE: 98 F | WEIGHT: 173 LBS | DIASTOLIC BLOOD PRESSURE: 83 MMHG | HEART RATE: 66 BPM

## 2023-10-13 PROCEDURE — 99214 OFFICE O/P EST MOD 30 MIN: CPT

## 2023-10-13 PROCEDURE — 99024 POSTOP FOLLOW-UP VISIT: CPT

## 2023-10-13 RX ORDER — ALBUTEROL SULFATE 90 UG/1
108 (90 BASE) INHALANT RESPIRATORY (INHALATION)
Qty: 1 | Refills: 3 | Status: ACTIVE | COMMUNITY
Start: 2023-10-13 | End: 1900-01-01

## 2023-10-16 ENCOUNTER — APPOINTMENT (OUTPATIENT)
Dept: PLASTIC SURGERY | Facility: HOSPITAL | Age: 58
End: 2023-10-16
Payer: MEDICAID

## 2023-10-16 PROCEDURE — 99024 POSTOP FOLLOW-UP VISIT: CPT

## 2023-10-27 ENCOUNTER — APPOINTMENT (OUTPATIENT)
Dept: PAIN MANAGEMENT | Facility: CLINIC | Age: 58
End: 2023-10-27

## 2023-11-02 ENCOUNTER — APPOINTMENT (OUTPATIENT)
Dept: HEMATOLOGY ONCOLOGY | Facility: CLINIC | Age: 58
End: 2023-11-02
Payer: MEDICAID

## 2023-11-02 VITALS
TEMPERATURE: 97.8 F | HEART RATE: 88 BPM | OXYGEN SATURATION: 98 % | WEIGHT: 175.44 LBS | BODY MASS INDEX: 31.09 KG/M2 | DIASTOLIC BLOOD PRESSURE: 66 MMHG | RESPIRATION RATE: 16 BRPM | SYSTOLIC BLOOD PRESSURE: 121 MMHG | HEIGHT: 63 IN

## 2023-11-02 PROCEDURE — 99214 OFFICE O/P EST MOD 30 MIN: CPT

## 2023-11-02 RX ORDER — HYDROCORTISONE 1 %
12 CREAM (GRAM) TOPICAL TWICE DAILY
Qty: 1 | Refills: 0 | Status: COMPLETED | COMMUNITY
Start: 2022-08-26 | End: 2023-11-02

## 2023-11-02 RX ORDER — ATORVASTATIN CALCIUM 10 MG/1
10 TABLET, FILM COATED ORAL
Qty: 90 | Refills: 2 | Status: COMPLETED | COMMUNITY
Start: 2020-10-09 | End: 2023-11-02

## 2023-11-02 RX ORDER — TIZANIDINE 2 MG/1
2 TABLET ORAL
Qty: 45 | Refills: 1 | Status: COMPLETED | COMMUNITY
Start: 2023-07-07 | End: 2023-11-02

## 2023-11-03 ENCOUNTER — NON-APPOINTMENT (OUTPATIENT)
Age: 58
End: 2023-11-03

## 2023-11-08 ENCOUNTER — NON-APPOINTMENT (OUTPATIENT)
Age: 58
End: 2023-11-08

## 2023-12-14 ENCOUNTER — APPOINTMENT (OUTPATIENT)
Dept: FAMILY MEDICINE | Facility: CLINIC | Age: 58
End: 2023-12-14
Payer: MEDICAID

## 2023-12-14 ENCOUNTER — APPOINTMENT (OUTPATIENT)
Dept: PULMONOLOGY | Facility: CLINIC | Age: 58
End: 2023-12-14
Payer: MEDICAID

## 2023-12-14 VITALS
BODY MASS INDEX: 31.01 KG/M2 | HEIGHT: 63 IN | TEMPERATURE: 93.8 F | SYSTOLIC BLOOD PRESSURE: 137 MMHG | WEIGHT: 175 LBS | DIASTOLIC BLOOD PRESSURE: 71 MMHG | OXYGEN SATURATION: 97 % | HEART RATE: 74 BPM

## 2023-12-14 VITALS
DIASTOLIC BLOOD PRESSURE: 80 MMHG | SYSTOLIC BLOOD PRESSURE: 120 MMHG | HEART RATE: 74 BPM | WEIGHT: 175 LBS | BODY MASS INDEX: 31.01 KG/M2 | OXYGEN SATURATION: 98 % | HEIGHT: 63 IN

## 2023-12-14 DIAGNOSIS — K65.4 SCLEROSING MESENTERITIS: ICD-10-CM

## 2023-12-14 DIAGNOSIS — R91.1 SOLITARY PULMONARY NODULE: ICD-10-CM

## 2023-12-14 PROCEDURE — 99214 OFFICE O/P EST MOD 30 MIN: CPT

## 2023-12-14 PROCEDURE — 99213 OFFICE O/P EST LOW 20 MIN: CPT

## 2023-12-14 RX ORDER — CALCIUM CARBONATE 500(1250)
500 TABLET ORAL
Refills: 0 | Status: DISCONTINUED | COMMUNITY
End: 2023-12-14

## 2023-12-14 NOTE — ASSESSMENT
[FreeTextEntry1] : History of sclerosis mesenteritis, following with hematology Dr. Wolf and considered medical management if symptoms returned.  Patient has appt in march but will call for earlier appt.  Trial PPI in interim to assist with possible reflux symptoms. Patient education provided regarding dietary changes to decrease triggers.  Abdominal exam benign today.  All questions answered and patient in agreement with plan.

## 2023-12-14 NOTE — PHYSICAL EXAM
[No Acute Distress] : no acute distress [Normal Appearance] : normal appearance [Normal Rate/Rhythm] : normal rate/rhythm [Normal S1, S2] : normal s1, s2 [No Resp Distress] : no resp distress [Clear to Auscultation Bilaterally] : clear to auscultation bilaterally [Normal Gait] : normal gait [No Focal Deficits] : no focal deficits [Oriented x3] : oriented x3 [Normal Affect] : normal affect

## 2023-12-14 NOTE — HISTORY OF PRESENT ILLNESS
[FreeTextEntry8] : 58 year old female here to follow up chronic abdominal pain x2 years.  Feels sharp intermittent epigastric pain, worse after tomato and dairy products.  Had CT abd/pelvis with sclerosis mesenteritis, previously following with hematology Dr. Camara.  Not taking any OTC meds for symptom relief.  Denies any fevers, chest pain, shortness of breath, nausea, vomiting, diarrhea, constipation.

## 2023-12-14 NOTE — HISTORY OF PRESENT ILLNESS
[TextBox_4] : 58 year old female with lung nodules comes for f/u Last visit she was started on Albuterol for intermittent dyspnea Last CT in 08/23 PFT's with decrease DLCO CT chest did not show any lung fibrosis or emphysema  Feels the same Has dyspnea when she goes upstairs or carry heavy things No wheezes, no cough Does not smoke No hospitalizations since the last visit

## 2023-12-20 ENCOUNTER — APPOINTMENT (OUTPATIENT)
Dept: DERMATOLOGY | Facility: CLINIC | Age: 58
End: 2023-12-20
Payer: MEDICAID

## 2023-12-20 PROCEDURE — 99205 OFFICE O/P NEW HI 60 MIN: CPT

## 2023-12-21 ENCOUNTER — APPOINTMENT (OUTPATIENT)
Dept: OBGYN | Facility: CLINIC | Age: 58
End: 2023-12-21
Payer: MEDICAID

## 2023-12-21 VITALS
WEIGHT: 174 LBS | BODY MASS INDEX: 30.83 KG/M2 | SYSTOLIC BLOOD PRESSURE: 120 MMHG | HEIGHT: 63 IN | DIASTOLIC BLOOD PRESSURE: 70 MMHG

## 2023-12-21 DIAGNOSIS — Z01.419 ENCOUNTER FOR GYNECOLOGICAL EXAMINATION (GENERAL) (ROUTINE) W/OUT ABNORMAL FINDINGS: ICD-10-CM

## 2023-12-21 PROCEDURE — 99386 PREV VISIT NEW AGE 40-64: CPT

## 2023-12-21 NOTE — HISTORY OF PRESENT ILLNESS
[FreeTextEntry1] : 59 y/o P1 presents for annual GYN exam.   , but not sexually active for many years.  PM since age 48. No PMB.  No GYN complaints today.  Diagnosed with melanoma of the cheek. Has surgery and reconstruction, but still residual tumor so currently seeing other providers for addition recommendations.  Pap 3/10/21 NILM/HPV-  Mammogram 10/12/23- BI-RADS 2  Colonoscopy 2021 reports normal with 5-year recall.  Bone density 9/30/22 -osteopenia/osteoporosis.  Had one Reclast treatment. Followed by Dr. Hernandez.  FH of colon cancer.  Denies FH of ca of ovary, breast or uterus.  Stays at home. Helps her elderly mother.  Daughter graduated, works as a  and in elementary school.

## 2023-12-28 NOTE — HISTORY OF PRESENT ILLNESS
[FreeTextEntry1] : Mariaelena is here today for a routine follow-up visit   58-year-old female presents s/p excision and closure of melanoma of left cheek on 10/5/23. At last visit she denies any f/c/n/v. She presents for routine post op visit and suture removal.,. She is doing well, no complaints offered.

## 2023-12-28 NOTE — ASSESSMENT
[FreeTextEntry1] : Healing well Reviewed care and course Sutures removed without complications, Steri strips applied. RTN as needed and FU with Dr. Wilkinson

## 2024-01-01 ENCOUNTER — NON-APPOINTMENT (OUTPATIENT)
Age: 59
End: 2024-01-01

## 2024-01-02 ENCOUNTER — APPOINTMENT (OUTPATIENT)
Dept: PLASTIC SURGERY | Facility: CLINIC | Age: 59
End: 2024-01-02

## 2024-01-04 ENCOUNTER — APPOINTMENT (OUTPATIENT)
Dept: FAMILY MEDICINE | Facility: CLINIC | Age: 59
End: 2024-01-04
Payer: MEDICAID

## 2024-01-04 VITALS
HEIGHT: 63 IN | WEIGHT: 173 LBS | TEMPERATURE: 97.3 F | SYSTOLIC BLOOD PRESSURE: 112 MMHG | BODY MASS INDEX: 30.65 KG/M2 | DIASTOLIC BLOOD PRESSURE: 70 MMHG | HEART RATE: 80 BPM

## 2024-01-04 DIAGNOSIS — E78.5 HYPERLIPIDEMIA, UNSPECIFIED: ICD-10-CM

## 2024-01-04 DIAGNOSIS — Z01.818 ENCOUNTER FOR OTHER PREPROCEDURAL EXAMINATION: ICD-10-CM

## 2024-01-04 DIAGNOSIS — F41.9 ANXIETY DISORDER, UNSPECIFIED: ICD-10-CM

## 2024-01-04 PROCEDURE — 99214 OFFICE O/P EST MOD 30 MIN: CPT

## 2024-01-04 PROCEDURE — G2211 COMPLEX E/M VISIT ADD ON: CPT

## 2024-01-04 NOTE — HEALTH RISK ASSESSMENT
[No] : No [No falls in past year] : Patient reported no falls in the past year [0] : 2) Feeling down, depressed, or hopeless: Not at all (0) [[id number]] : 0 [Former] : Former [10-14] : 10-14 [> 15 Years] : > 15 Years

## 2024-01-08 ENCOUNTER — NON-APPOINTMENT (OUTPATIENT)
Age: 59
End: 2024-01-08

## 2024-01-09 ENCOUNTER — APPOINTMENT (OUTPATIENT)
Dept: DERMATOLOGY | Facility: CLINIC | Age: 59
End: 2024-01-09
Payer: MEDICAID

## 2024-01-09 ENCOUNTER — NON-APPOINTMENT (OUTPATIENT)
Age: 59
End: 2024-01-09

## 2024-01-09 PROCEDURE — 11643 EXC F/E/E/N/L MAL+MRG 2.1-3: CPT

## 2024-01-10 NOTE — HISTORY OF PRESENT ILLNESS
[FreeTextEntry1] : Slow Mohs for incompletely excised melanoma of left cheek/cutaneous lower eyelid x 2 [de-identified] : 12/20/2023 Slow Mohs consultation for incompletely excised melanoma of left cheek/cutaneous lower eyelid x 2 01/09/2024 Slow Mohs for incompletely excised melanoma of left cheek/cutaneous lower eyelid x 2   Referred by: Dr. Land   We had the pleasure of seeing your patient for Slow Mohs Micrographic Surgery.  Ms. JAG TALBERT is a 58 year old F who presents for Slow Mohs of an incompletely excised melanoma on the left cheek/cutaneous lower eyelid x 2, history outlined below: The patient reports that she has noticed a lesion in the area since at least 2019 and that it has grown over time.  Underwent biopsy in June 2023 consistent with melanoma, depth of 0.3mm. Underwent excision and flap repair on 08/31/23 with Dr. Land and Dr. Alcaraz, respectively and subsequently noted to have melanoma of 1.3mm in depth, as well as residual melanoma on pathology specimen. Second excision procedure was planned for October 5th. Prior to procedure, patient underwent a nuclear medicine scan to identify possible sentinel nodes to biopsy during the procedure. The scan noted activity in the left frontal hairline (an atypical location for lymphatic drainage from this region, and demonstrated "nondefinitive lymph node localization." On 10/5, a second excision and flap repair procedure was performed, with reports that the pathology remained concerning for persistent melanoma along a margin.  We reviewed the slides from [id number], labeled [id number].  At consultation, the patient and her daughter were considering treatment options for the persistent melanoma, including possible lymph node biopsy with possible consideration of immunotherapy vs. a third excision and repair.   SH: Not currently working due to spinal disc issues. In the past worked as a  at [employer]. Lives in [facility] with her  and daughter. Daughter Yoli studied biology in college, is now working at a local school and is a  in [place].  Upcoming travel plans: No Pertinent positives noted below  History of HIV or hepatitis: No Blood thinners: No Antibiotic Prophylaxis: None  Medical implants: None   The patient's review of systems questionnaire was reviewed. Education needs were identified. There were no barriers to learning.

## 2024-01-10 NOTE — ASSESSMENT
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 I recommended slow Mohs surgery due to the tumor type, location, and ill-defined nature of cancer. I explained that following extirpation there will be a full thickness defect of the involved area. The reconstructive options will be based on the defect size and surrounding tissue laxity of the involved area. Primary closure is only possible for smaller defects. For larger defects, local tissue rearrangement or skin grafting may be necessary. Risks following layered primary closure or local tissue rearrangement include wound dehiscence, contour irregularity, bleeding, infection, and paresthesia (nerve damage including sensory deficit or motor weakness). Risks following skin grafting include wound dehiscence, skin graft nonadherence (partial or complete), contour irregularity, bleeding, infection, paresthesia, and donor site complications. I explained that the patient will need to abstain from physical activity for 1-2 weeks following the surgery, that they would heal with a scar, and also discussed the chances of infection, bleeding, potential sensory or motor nerve damage, and recurrence.  The patient indicated that s/he understood the risks and wished to schedule the procedure. -- Will discuss re-consideration of sentinel node biopsy.  -- In particular, for reconstruction we discussed Plastic surgery referral as above. I personally reviewed the records and pathology slides  >60 min spent with patient and reviewing records and slides today   Thank you for this slow Mohs surgery referral. We recommended that Ms. JAG TALBERT follow up with dermatology for skin exams every 3 months. Would also suggest she establish care with medical oncology after she recovers.   Ashely Roman MD, [facility] Director of Dermatologic Surgery & Dermato-Oncology [facility] [address], [address] [place] [address]

## 2024-01-10 NOTE — PHYSICAL EXAM
[Alert] : alert [Oriented x 3] : ~L oriented x 3 [Well Nourished] : well nourished [Conjunctiva Non-injected] : conjunctiva non-injected [No Visual Lymphadenopathy] : no visual  lymphadenopathy [No Clubbing] : no clubbing [No Edema] : no edema [No Bromhidrosis] : no bromhidrosis [No Chromhidrosis] : no chromhidrosis [FreeTextEntry3] : Well healed skin flap along the left malar cheek, infraorbital and temple skin - no residual clinically visible pigmentation

## 2024-01-11 ENCOUNTER — NON-APPOINTMENT (OUTPATIENT)
Age: 59
End: 2024-01-11

## 2024-01-11 RX ORDER — HYDROXYZINE HYDROCHLORIDE 10 MG/1
10 TABLET ORAL
Qty: 90 | Refills: 1 | Status: ACTIVE | COMMUNITY
Start: 2023-09-27 | End: 1900-01-01

## 2024-01-12 ENCOUNTER — APPOINTMENT (OUTPATIENT)
Dept: FAMILY MEDICINE | Facility: CLINIC | Age: 59
End: 2024-01-12

## 2024-01-12 PROBLEM — Z01.818 PREOPERATIVE CLEARANCE: Status: ACTIVE | Noted: 2024-01-12

## 2024-01-12 PROBLEM — F41.9 ANXIETY DISORDER, UNSPECIFIED TYPE: Status: ACTIVE | Noted: 2019-03-25

## 2024-01-12 PROBLEM — E78.5 HYPERLIPIDEMIA: Status: ACTIVE | Noted: 2019-04-19

## 2024-01-12 LAB
ALBUMIN SERPL ELPH-MCNC: 4.3 G/DL
ALP BLD-CCNC: 72 U/L
ALT SERPL-CCNC: 21 U/L
ANION GAP SERPL CALC-SCNC: 15 MMOL/L
AST SERPL-CCNC: 21 U/L
BILIRUB SERPL-MCNC: 0.3 MG/DL
BUN SERPL-MCNC: 17 MG/DL
CALCIUM SERPL-MCNC: 9.4 MG/DL
CHLORIDE SERPL-SCNC: 100 MMOL/L
CHOLEST SERPL-MCNC: 225 MG/DL
CO2 SERPL-SCNC: 25 MMOL/L
CREAT SERPL-MCNC: 0.81 MG/DL
EGFR: 83 ML/MIN/1.73M2
ESTIMATED AVERAGE GLUCOSE: 126 MG/DL
GLUCOSE SERPL-MCNC: 86 MG/DL
HBA1C MFR BLD HPLC: 6 %
HDLC SERPL-MCNC: 61 MG/DL
LDLC SERPL CALC-MCNC: 149 MG/DL
NONHDLC SERPL-MCNC: 165 MG/DL
POTASSIUM SERPL-SCNC: 3.9 MMOL/L
PROT SERPL-MCNC: 7 G/DL
SODIUM SERPL-SCNC: 139 MMOL/L
T4 FREE SERPL-MCNC: 1.7 NG/DL
TRIGL SERPL-MCNC: 90 MG/DL
TSH SERPL-ACNC: 3.51 UIU/ML

## 2024-01-15 ENCOUNTER — RESULT REVIEW (OUTPATIENT)
Age: 59
End: 2024-01-15

## 2024-01-16 ENCOUNTER — RESULT REVIEW (OUTPATIENT)
Age: 59
End: 2024-01-16

## 2024-01-16 LAB
BASOPHILS # BLD AUTO: 0.09 K/UL
BASOPHILS NFR BLD AUTO: 0.9 %
EOSINOPHIL # BLD AUTO: 0.38 K/UL
EOSINOPHIL NFR BLD AUTO: 4 %
HCT VFR BLD CALC: 42.6 %
HGB BLD-MCNC: 13.4 G/DL
IMM GRANULOCYTES NFR BLD AUTO: 0.3 %
LYMPHOCYTES # BLD AUTO: 2.29 K/UL
LYMPHOCYTES NFR BLD AUTO: 24.1 %
MAN DIFF?: NORMAL
MCHC RBC-ENTMCNC: 29.8 PG
MCHC RBC-ENTMCNC: 31.5 GM/DL
MCV RBC AUTO: 94.7 FL
MONOCYTES # BLD AUTO: 0.75 K/UL
MONOCYTES NFR BLD AUTO: 7.9 %
NEUTROPHILS # BLD AUTO: 5.96 K/UL
NEUTROPHILS NFR BLD AUTO: 62.8 %
PLATELET # BLD AUTO: 327 K/UL
RBC # BLD: 4.5 M/UL
RBC # FLD: 13.4 %
WBC # FLD AUTO: 9.5 K/UL

## 2024-01-16 NOTE — ASSESSMENT
[No Further Testing Recommended] : no further testing recommended [Patient Optimized for Surgery] : Patient optimized for surgery [Continue medications as is] : Continue current medications [As per surgery] : as per surgery [FreeTextEntry4] : 59 yo F is medically optimized for left cheek wound closure procedure at this time.

## 2024-01-16 NOTE — HISTORY OF PRESENT ILLNESS
[No Pertinent Cardiac History] : no history of aortic stenosis, atrial fibrillation, coronary artery disease, recent myocardial infarction, or implantable device/pacemaker [No Pertinent Pulmonary History] : no history of asthma, COPD, sleep apnea, or smoking [No Adverse Anesthesia Reaction] : no adverse anesthesia reaction in self or family member [(Patient denies any chest pain, claudication, dyspnea on exertion, orthopnea, palpitations or syncope)] : Patient denies any chest pain, claudication, dyspnea on exertion, orthopnea, palpitations or syncope [Moderate (4-6 METs)] : Moderate (4-6 METs) [Chronic Anticoagulation] : no chronic anticoagulation [Chronic Kidney Disease] : no chronic kidney disease [Diabetes] : no diabetes [FreeTextEntry1] : Cheek Wound Closure [FreeTextEntry2] : 1/16/24 [FreeTextEntry3] : Dr. Alcaraz [FreeTextEntry4] : 59 yo F with hypothyroidism, hyperlipidemia, anxiety and depression, melanoma of the left cheek presenting for preoperative clearance for cheek wound closure procedure. Patient was planned to undergo a Slow Mohs procedure with wound planned to be left open until biopsy results were finalized.Would follow with reconstruction of left cheek wound after undergoing Slow Mohs for previously incompletely excised melanoma. Patient reports no acute concerns or complaints. Denies any headaches. chest pain, shortness of breath, nausea/vomiting, diarrhea.

## 2024-01-16 NOTE — PHYSICAL EXAM
[No Acute Distress] : no acute distress [Well Nourished] : well nourished [Well Developed] : well developed [Well-Appearing] : well-appearing [Normal Sclera/Conjunctiva] : normal sclera/conjunctiva [PERRL] : pupils equal round and reactive to light [EOMI] : extraocular movements intact [Normal Outer Ear/Nose] : the outer ears and nose were normal in appearance [Normal Oropharynx] : the oropharynx was normal [No JVD] : no jugular venous distention [No Lymphadenopathy] : no lymphadenopathy [Supple] : supple [Thyroid Normal, No Nodules] : the thyroid was normal and there were no nodules present [No Respiratory Distress] : no respiratory distress  [No Accessory Muscle Use] : no accessory muscle use [Clear to Auscultation] : lungs were clear to auscultation bilaterally [Normal Rate] : normal rate  [Normal S1, S2] : normal S1 and S2 [Regular Rhythm] : with a regular rhythm [No Murmur] : no murmur heard [No Carotid Bruits] : no carotid bruits [No Abdominal Bruit] : a ~M bruit was not heard ~T in the abdomen [No Varicosities] : no varicosities [Pedal Pulses Present] : the pedal pulses are present [No Edema] : there was no peripheral edema [No Palpable Aorta] : no palpable aorta [No Extremity Clubbing/Cyanosis] : no extremity clubbing/cyanosis [Soft] : abdomen soft [Non Tender] : non-tender [Non-distended] : non-distended [No Masses] : no abdominal mass palpated [No HSM] : no HSM [Normal Bowel Sounds] : normal bowel sounds [Normal Posterior Cervical Nodes] : no posterior cervical lymphadenopathy [Normal Anterior Cervical Nodes] : no anterior cervical lymphadenopathy [No CVA Tenderness] : no CVA  tenderness [No Spinal Tenderness] : no spinal tenderness [No Joint Swelling] : no joint swelling [Grossly Normal Strength/Tone] : grossly normal strength/tone [No Rash] : no rash [Coordination Grossly Intact] : coordination grossly intact [No Focal Deficits] : no focal deficits [Normal Gait] : normal gait [Deep Tendon Reflexes (DTR)] : deep tendon reflexes were 2+ and symmetric [Normal Affect] : the affect was normal [Normal Insight/Judgement] : insight and judgment were intact [de-identified] : left cheek surgical

## 2024-01-17 ENCOUNTER — TRANSCRIPTION ENCOUNTER (OUTPATIENT)
Age: 59
End: 2024-01-17

## 2024-01-23 ENCOUNTER — APPOINTMENT (OUTPATIENT)
Dept: PLASTIC SURGERY | Facility: CLINIC | Age: 59
End: 2024-01-23

## 2024-01-30 ENCOUNTER — APPOINTMENT (OUTPATIENT)
Dept: PLASTIC SURGERY | Facility: CLINIC | Age: 59
End: 2024-01-30
Payer: MEDICAID

## 2024-01-30 PROCEDURE — 99024 POSTOP FOLLOW-UP VISIT: CPT

## 2024-02-09 ENCOUNTER — APPOINTMENT (OUTPATIENT)
Dept: ENDOCRINOLOGY | Facility: CLINIC | Age: 59
End: 2024-02-09
Payer: MEDICAID

## 2024-02-09 VITALS
BODY MASS INDEX: 31.54 KG/M2 | DIASTOLIC BLOOD PRESSURE: 62 MMHG | SYSTOLIC BLOOD PRESSURE: 102 MMHG | OXYGEN SATURATION: 98 % | HEART RATE: 68 BPM | HEIGHT: 63 IN | WEIGHT: 178 LBS

## 2024-02-09 PROCEDURE — 99215 OFFICE O/P EST HI 40 MIN: CPT

## 2024-02-09 RX ORDER — OXYCODONE AND ACETAMINOPHEN 5; 325 MG/1; MG/1
5-325 TABLET ORAL
Qty: 20 | Refills: 0 | Status: DISCONTINUED | COMMUNITY
Start: 2024-01-10 | End: 2024-02-09

## 2024-02-16 ENCOUNTER — RESULT REVIEW (OUTPATIENT)
Age: 59
End: 2024-02-16

## 2024-02-20 ENCOUNTER — APPOINTMENT (OUTPATIENT)
Dept: PLASTIC SURGERY | Facility: CLINIC | Age: 59
End: 2024-02-20
Payer: MEDICAID

## 2024-02-20 PROCEDURE — 99024 POSTOP FOLLOW-UP VISIT: CPT

## 2024-02-21 ENCOUNTER — RESULT REVIEW (OUTPATIENT)
Age: 59
End: 2024-02-21

## 2024-02-22 ENCOUNTER — APPOINTMENT (OUTPATIENT)
Dept: PULMONOLOGY | Facility: CLINIC | Age: 59
End: 2024-02-22
Payer: MEDICAID

## 2024-02-22 VITALS
DIASTOLIC BLOOD PRESSURE: 49 MMHG | BODY MASS INDEX: 31.54 KG/M2 | WEIGHT: 178 LBS | SYSTOLIC BLOOD PRESSURE: 139 MMHG | TEMPERATURE: 95 F | OXYGEN SATURATION: 93 % | HEIGHT: 63 IN | HEART RATE: 77 BPM

## 2024-02-22 DIAGNOSIS — R06.09 OTHER FORMS OF DYSPNEA: ICD-10-CM

## 2024-02-22 PROCEDURE — 99214 OFFICE O/P EST MOD 30 MIN: CPT

## 2024-02-22 NOTE — HISTORY OF PRESENT ILLNESS
[TextBox_4] : 58 year old female with lung nodules, dyspnea came for f/u CT chest seen, my read: the nodularity in the RtLL is less defined compared to 08/23. Rest of the nodules are unchanged Echo normal  Feels better with dyspnea. Still has it when she carries something. No cough, no wheezes. No other respiratory complaints Does not smoke CT results d/w her

## 2024-02-22 NOTE — PHYSICAL EXAM
[No Acute Distress] : no acute distress [Normal Appearance] : normal appearance [Normal Rate/Rhythm] : normal rate/rhythm [Normal S1, S2] : normal s1, s2 [No Resp Distress] : no resp distress [Clear to Auscultation Bilaterally] : clear to auscultation bilaterally [No Clubbing] : no clubbing [No Focal Deficits] : no focal deficits [Oriented x3] : oriented x3 [Normal Affect] : normal affect

## 2024-02-25 NOTE — PHYSICAL EXAM
[Alert] : alert [Well Nourished] : well nourished [No Acute Distress] : no acute distress [Well Developed] : well developed [Normal Sclera/Conjunctiva] : normal sclera/conjunctiva [EOMI] : extra ocular movement intact [No Proptosis] : no proptosis [Normal Oropharynx] : the oropharynx was normal [No Thyroid Nodules] : no palpable thyroid nodules [No Respiratory Distress] : no respiratory distress [No Accessory Muscle Use] : no accessory muscle use [Clear to Auscultation] : lungs were clear to auscultation bilaterally [Normal S1, S2] : normal S1 and S2 [Normal Rate] : heart rate was normal [Regular Rhythm] : with a regular rhythm [No Edema] : no peripheral edema [Pedal Pulses Normal] : the pedal pulses are present [Normal Anterior Cervical Nodes] : no anterior cervical lymphadenopathy [No Spinal Tenderness] : no spinal tenderness [Spine Straight] : spine straight [No Stigmata of Cushings Syndrome] : no stigmata of Cushings Syndrome [Normal Gait] : normal gait [Normal Strength/Tone] : muscle strength and tone were normal [No Rash] : no rash [Normal Reflexes] : deep tendon reflexes were 2+ and symmetric [No Tremors] : no tremors [Oriented x3] : oriented to person, place, and time [Obese] : obese [Acanthosis Nigricans] : no acanthosis nigricans [de-identified] : Enlarged thyroid

## 2024-02-25 NOTE — HISTORY OF PRESENT ILLNESS
[FreeTextEntry1] : Ms. JAG TALBERT is a 58 year old female coming for f/u hypothyroidism, also diagnosed with Ostoporosis on last DEXA 9/22 denies h/o fractures denies h/o kidney stones denies FHX osteoporosis  denies FHx hip fractures on Vit D 2,000 IU qd just started calcium a week ago , does not know how much she takes  still having hair loss has dermatology remi  March 2023        used to be on Levothyroxine 75mcg same dose for about a month dose increased from 88mcg RYAN Synthroid by her PCP by her PCP Dr Preet Gutierrez [place]        11/7/2016 was swiched from Synthroid to [place] alternates from 30mg qod to 45mg qod        feels better, denies palpitations, forgets to take the 15mg on and off about twice a week per pt         negative cardiac workup per pt        liked it but she can not afford two copays, asking to try 60mg        will repeat labs first and decide depending on her results        seen Dr Potts before who diagnosed her with hypothyroidism, felt really tired, pains all over        last labwork 8/12/16 by Dr Chen her asthma doctor has an echocardiogram scheduled , sees Cardiology Dr Prasad on [address]        AST mildly elevated 46        TSH 0.33        thyroid US 9/2015 showed 7mm isthmus nodule        denies FHx thyroid Ca        denies neck iraadiation        has problems swallowing solid food, on and off        has dysphonia on and off        has asthma, has dyspnea        repeated thyroid US 9/2016 stable 7mm isthmus nodule.  2/9/24 follow up- Pt states she is feeling good. Hair doing better.  Takes thyroid every morning.   Has prediabetes. Refusing to start on metformin.  Teeth are okay. No upcoming extractions. Has to follow up with the dentist soon. Last follow up was in April.

## 2024-02-27 NOTE — HISTORY OF PRESENT ILLNESS
[FreeTextEntry1] : 58-year-old female presents s/p 3 excisions and 3 closures of melanoma of left cheek on 10/5/23. Cheek flap viable. Minimal crusting along incision. All superficial sutures removed. Scar managment reviewed. Ok to start medical treatment. Follow up in 6 months to assess quality of long term results.

## 2024-03-14 ENCOUNTER — RESULT REVIEW (OUTPATIENT)
Age: 59
End: 2024-03-14

## 2024-03-14 ENCOUNTER — APPOINTMENT (OUTPATIENT)
Dept: HEMATOLOGY ONCOLOGY | Facility: CLINIC | Age: 59
End: 2024-03-14
Payer: MEDICAID

## 2024-03-14 ENCOUNTER — NON-APPOINTMENT (OUTPATIENT)
Age: 59
End: 2024-03-14

## 2024-03-14 VITALS
HEIGHT: 63 IN | TEMPERATURE: 97 F | DIASTOLIC BLOOD PRESSURE: 75 MMHG | HEART RATE: 82 BPM | WEIGHT: 176.56 LBS | OXYGEN SATURATION: 98 % | SYSTOLIC BLOOD PRESSURE: 143 MMHG | BODY MASS INDEX: 31.29 KG/M2 | RESPIRATION RATE: 17 BRPM

## 2024-03-14 DIAGNOSIS — K65.4 SCLEROSING MESENTERITIS: ICD-10-CM

## 2024-03-14 DIAGNOSIS — M54.12 RADICULOPATHY, CERVICAL REGION: ICD-10-CM

## 2024-03-14 DIAGNOSIS — R91.8 OTHER NONSPECIFIC ABNORMAL FINDING OF LUNG FIELD: ICD-10-CM

## 2024-03-14 PROCEDURE — 99215 OFFICE O/P EST HI 40 MIN: CPT

## 2024-03-14 PROCEDURE — G2211 COMPLEX E/M VISIT ADD ON: CPT | Mod: NC,1L

## 2024-03-15 ENCOUNTER — APPOINTMENT (OUTPATIENT)
Dept: BREAST CENTER | Facility: CLINIC | Age: 59
End: 2024-03-15
Payer: MEDICAID

## 2024-03-15 VITALS
HEIGHT: 62 IN | HEART RATE: 63 BPM | BODY MASS INDEX: 31.83 KG/M2 | OXYGEN SATURATION: 96 % | SYSTOLIC BLOOD PRESSURE: 133 MMHG | WEIGHT: 173 LBS | DIASTOLIC BLOOD PRESSURE: 75 MMHG

## 2024-03-15 DIAGNOSIS — C43.39 MALIGNANT MELANOMA OF OTHER PARTS OF FACE: ICD-10-CM

## 2024-03-15 PROBLEM — K65.4: Status: ACTIVE | Noted: 2021-10-19

## 2024-03-15 PROCEDURE — 99213 OFFICE O/P EST LOW 20 MIN: CPT

## 2024-03-15 NOTE — HISTORY OF PRESENT ILLNESS
[FreeTextEntry1] : 8/23 wide excision left cheek melanoma. Residual melanoma to a depth of 1.3 mm with melanoma in situ at margins involving 7-10 o'clock [id number], Stage 1b  1023 left cheek reexcision, attempted/failed sentinel node biopsy.  (No migration with lymphoscintigraphy) Preliminary pathology results of the reexcision specimen shows no evidence of melanoma or melanoma in situ, Derm path consultation is pending.  Patient denies any skin changes or nodules.  Patient just came back from medical oncology and they are going to schedule a CT scan of the neck this coming June.  57-year-old woman has had a left cheek pigmented lesion for the last 10 years which has increased in size for the last 2 years.  Patient underwent a shave biopsy which revealed a 0.3 mm Umair level III melanoma without ulceration with a low mitotic index, pT1a.  Patient has not had any melanomas or other skin cancers and has no family history of melanoma although she has a family history of colon and breast cancer.  Patient has no other symptoms.

## 2024-03-15 NOTE — PHYSICAL EXAM
[Fully active, able to carry on all pre-disease performance without restriction] : Status 0 - Fully active, able to carry on all pre-disease performance without restriction [Normal] : affect appropriate [de-identified] : Left cheek surgical site healing well

## 2024-03-15 NOTE — REASON FOR VISIT
[Follow-Up: _____] : a [unfilled] follow-up visit [FreeTextEntry1] : Status post wide excision of a melanoma in the left cheek

## 2024-03-15 NOTE — ASSESSMENT
[FreeTextEntry1] : Left cheek melanoma s/p shave biopsy which revealed a 0.3 mm Umair level III melanoma. No ulceration. 1 mitosis/mm2. No microsatellitosis, lymphovascular invasion Pathologic stage pT1a saw Dr Twin Wilkinson s/p wide excision on 8/31/23 Path: 1.3 mm thick non ulcerated melanoma and melanoma in situ involving margins, pathological stage pT2a s/p WLE with Dr Wilkinson 10/5/23 Intraop lymphoscintigraphy with technetium showed no migration to the lymph node basin and consequently she did not have SLNB Pathology stage Ib (pT2a NX M0)  She is seen today for follow-up Overall healing well from her surgery Discussed at length about the diagnosis, work up, staging, prognosis and treatment options She has pathology stage Ib disease, however lymph node basin could not be assessed Recommend CT neck chest abdomen and pelvis for monitoring.  She will do it in 3 months Encouraged to have dermatology visit every 6 to 12 months No indication for adjuvant immunotherapy  Sclerosing Mesenteritis Biopsy proven INtermittent abdominal pain Has received prednisone periodically Was briefly lost to follow up Seen today for follow up No abdominal pain No tenderness on palpation Explained that for recurrent pain we can consider tamoxifen+prednisone combination For lack of response- will consider pentoxifylline or azathioprine Advised to be uptodate with her age appropriate cancer screening which was discussed  Repeat scan in 3 months  Lung infiltrates FOllow up with Dr Knox D/W Dr Knox about the RUL nodule, likely mucus plugging  Social Hx Lives with her  in [place] Used to work in [employer], not current employed  Patient had multiple questions which were answered to satisfaction  Follow up in 3-4 months or sooner if needed.  Restaging scan needs 3-month CBC, CMP, LDH, ESR, CRP

## 2024-03-15 NOTE — HISTORY OF PRESENT ILLNESS
[de-identified] : 55 year old female presents today for initial consultation of  a questionable retroperitoneal mass and lung nodules.  She presented to ER in July with complaints of abdominal pain x 2 weeks, she was found to have infiltration of the retroperitoneal mesentery, extensive shotty adenopathy, 4cm soft tissue mass, and was admitted for further management to rule out malignancy.  General Surgeon Dr. Butler consulted and recommended IR guided biopsy.  Outpatient Pulmonologist Dr. Knox consulted.  Outpatient PET scan which showed no uptake in lungs or abdomen and has repeat CT Chest in September 2021.  No IR or surgical intervention recommended at this time.    PET SCAN 3/2021- No evidence of hypermetabolic focus in the lung parenchyma. Bilateral pulmonary opacities- F/u 6 month diagnostic CT chest is advised.  No evidence of hypermetabolic activity in the mediastinal and cervical nodes.  Diffuse hypermetabolic activity in the thyroid gland can be secondary to thyroiditis.    CT chest 9/10/2021- Multiple diverticuli in lower descending colon and sigmoid colon, without evidence of acute diverticulitis.  Multiple calculi in normal sized gallbladder, without additional CT signs of acute cholecystitis no evidence of biliary duct dilatation.  No change in hazy increased density in mesentery, multiple small mesenteric lymph nodes, or in lobulated left mesenteric mass that is partially calcified and measures approximately 4 x 2 x 3 cm, since prior CT of 7/8/2021.  No other evidence of acute abdominal or pelvic pathology.  Colonoscopy done- Aug 26, 2021- pt reports WNL with Dr. Nunez  Family Hx- MGM lung cancer (smoker) PGM metastatic breast cancer postmenopausal, father with colon cancer- alive, paternal cousin with colon cancer- alive  Smoking Hx- smoked for 29 years, quit 16 years ago, smoked 2 PPD  She was last seen by Dr Garay in Feb 2022 and Dr Fonseca (1/2022) and subsequently decided to stop following and then switched care  She has left cheek rash x 15 years, has gradually increased in size She has biopsy in 2021 which showed atypical junctional proliferation of melanocytes, was recommended excision. She had appt at that time but could not drive and was lost to follow up The rash was increasing in size-> saw HELENE Rodriguez ([facility])  Status post biopsy Path: 10/5/23 Residual Melanoma in situ involving the approximately 11:00-12:00 o'oclock margin  [de-identified] : Patient is seen today for follow-up   Overall feeling well anticoagulated from her surgery

## 2024-03-15 NOTE — PHYSICAL EXAM
[No Supraclavicular Adenopathy] : no supraclavicular adenopathy [No Cervical Adenopathy] : no cervical adenopathy [Clear to Auscultation Bilat] : clear to auscultation bilaterally [de-identified] : Scars healed well without signs of recurrence or satellite lesions. [de-identified] : No parotid masses.

## 2024-03-20 LAB
CHOLEST SERPL-MCNC: 223 MG/DL
ESTIMATED AVERAGE GLUCOSE: 131 MG/DL
HBA1C MFR BLD HPLC: 6.2 %
HDLC SERPL-MCNC: 56 MG/DL
LDLC SERPL CALC-MCNC: 145 MG/DL
NONHDLC SERPL-MCNC: 168 MG/DL
TRIGL SERPL-MCNC: 127 MG/DL

## 2024-03-25 ENCOUNTER — APPOINTMENT (OUTPATIENT)
Dept: ENDOCRINOLOGY | Facility: CLINIC | Age: 59
End: 2024-03-25
Payer: MEDICAID

## 2024-03-25 DIAGNOSIS — E55.9 VITAMIN D DEFICIENCY, UNSPECIFIED: ICD-10-CM

## 2024-03-25 DIAGNOSIS — E04.1 NONTOXIC SINGLE THYROID NODULE: ICD-10-CM

## 2024-03-25 DIAGNOSIS — M81.0 AGE-RELATED OSTEOPOROSIS W/OUT CURRENT PATHOLOGICAL FRACTURE: ICD-10-CM

## 2024-03-25 DIAGNOSIS — E03.9 HYPOTHYROIDISM, UNSPECIFIED: ICD-10-CM

## 2024-03-25 DIAGNOSIS — R73.03 PREDIABETES.: ICD-10-CM

## 2024-03-25 PROCEDURE — 99215 OFFICE O/P EST HI 40 MIN: CPT

## 2024-03-25 RX ORDER — METFORMIN ER 500 MG 500 MG/1
500 TABLET ORAL
Qty: 60 | Refills: 3 | Status: ACTIVE | COMMUNITY
Start: 2024-03-25 | End: 1900-01-01

## 2024-03-25 NOTE — HISTORY OF PRESENT ILLNESS
[Home] : at home, [unfilled] , at the time of the visit. [Medical Office: ([facility]– [place])___] : at the medical office located in  [Verbal consent obtained from patient] : the patient, [unfilled] [FreeTextEntry1] : First Reclast 3/31/23 Ms. JAG TALBERT is a 58 year old female asking for explanation for her Reclast, also why she needs labs and upset as I said she does not want Metformin  coming for f/u hypothyroidism, also diagnosed with Ostoporosis on last DEXA 9/22 denies h/o fractures denies h/o kidney stones denies FHX osteoporosis  denies FHx hip fractures on Vit D 2,000 IU qd just started calcium a week ago , does not know how much she takes  still having hair loss has dermatology remi  March 2023        used to be on Levothyroxine 75mcg same dose for about a month dose increased from 88mcg RYAN Synthroid by her PCP by her PCP Dr Preet Gutierrez [place]        11/7/2016 was swiched from Synthroid to [place] alternates from 30mg qod to 45mg qod        feels better, denies palpitations, forgets to take the 15mg on and off about twice a week per pt         negative cardiac workup per pt        liked it but she can not afford two copays, asking to try 60mg        will repeat labs first and decide depending on her results        seen Dr Potts before who diagnosed her with hypothyroidism, felt really tired, pains all over        last labwork 8/12/16 by Dr Chen her asthma doctor has an echocardiogram scheduled , sees Cardiology Dr Prasad on [address]        AST mildly elevated 46        TSH 0.33        thyroid US 9/2015 showed 7mm isthmus nodule        denies FHx thyroid Ca        denies neck iraadiation        has problems swallowing solid food, on and off        has dysphonia on and off        has asthma, has dyspnea        repeated thyroid US 9/2016 stable 7mm isthmus nodule.  2/9/24 follow up- Pt states she is feeling good. Hair doing better.  Takes thyroid every morning.   Has prediabetes. Refusing to start on metformin.  Teeth are okay. No upcoming extractions. Has to follow up with the dentist soon. Last follow up was in April.   3/25/24 telehealth follow up- Labs reviewed.  A1c was up to 6.2%. Sugar was also elevated. Pt now agreeable to starting on metformin.  Continues to take Tirosint 88 mcg every morning. Thyroid levels are better.  Bone density from Feb 2024 was reviewed. Planning to start calcium and take 4 hours apart from the Tirosint. Already taking vitamin D on a daily basis aside from prior to having a surgery. Last vitamin D stoppage was in January. Due for Reclast #2 soon. Will repeat labs first.

## 2024-03-25 NOTE — END OF VISIT
[Time Spent: ___ minutes] : I have spent [unfilled] minutes of time on the encounter. [FreeTextEntry3] :  I, Kelechi Oneil, am scribing for and in the presence of Dr. Shira Hernandez in the following sections: HISTORY OF PRESENT ILLNESS; REVIEW OF SYSTEMS; PHYSICAL EXAM; ASSESSMENT/ PLAN. I, Shira Hernandez, personally performed the services described in the documentation, reviewed the documentation recorded by the scribe in my presence, and it accurately and completely records my words and actions. 3/25/2024.

## 2024-03-25 NOTE — PHYSICAL EXAM
[Alert] : alert [Well Nourished] : well nourished [Healthy Appearance] : healthy appearance [No Acute Distress] : no acute distress [Well Developed] : well developed [Normal Voice/Communication] : normal voice communication [Normal Sclera/Conjunctiva] : normal sclera/conjunctiva [Normal Outer Ear/Nose] : the ears and nose were normal in appearance [Normal Hearing] : hearing was normal [Normal Lips/Gums] : the lips and gums were normal [No Respiratory Distress] : no respiratory distress [Oriented x3] : oriented to person, place, and time [Normal Affect] : the affect was normal [Normal Insight/Judgement] : insight and judgment were intact [Normal Mood] : the mood was normal

## 2024-04-25 LAB
25(OH)D3 SERPL-MCNC: 45 NG/ML
CALCIUM SERPL-MCNC: 9.5 MG/DL
PARATHYROID HORMONE INTACT: 48 PG/ML

## 2024-04-25 RX ORDER — LEVOTHYROXINE SODIUM 88 UG/1
88 CAPSULE ORAL DAILY
Qty: 90 | Refills: 1 | Status: ACTIVE | COMMUNITY
Start: 2019-03-27 | End: 1900-01-01

## 2024-05-10 ENCOUNTER — NON-APPOINTMENT (OUTPATIENT)
Age: 59
End: 2024-05-10

## 2024-06-07 ENCOUNTER — RX RENEWAL (OUTPATIENT)
Age: 59
End: 2024-06-07

## 2024-06-07 RX ORDER — PANTOPRAZOLE 40 MG/1
40 TABLET, DELAYED RELEASE ORAL
Qty: 90 | Refills: 0 | Status: ACTIVE | COMMUNITY
Start: 2023-12-14 | End: 1900-01-01

## 2024-06-12 ENCOUNTER — NON-APPOINTMENT (OUTPATIENT)
Age: 59
End: 2024-06-12

## 2024-07-10 ENCOUNTER — APPOINTMENT (OUTPATIENT)
Dept: HEMATOLOGY ONCOLOGY | Facility: CLINIC | Age: 59
End: 2024-07-10

## 2024-07-11 ENCOUNTER — NON-APPOINTMENT (OUTPATIENT)
Age: 59
End: 2024-07-11

## 2024-09-04 ENCOUNTER — RX RENEWAL (OUTPATIENT)
Age: 59
End: 2024-09-04

## 2024-09-09 ENCOUNTER — APPOINTMENT (OUTPATIENT)
Dept: ORTHOPEDIC SURGERY | Facility: CLINIC | Age: 59
End: 2024-09-09
Payer: MEDICAID

## 2024-09-09 VITALS
BODY MASS INDEX: 29.26 KG/M2 | WEIGHT: 159 LBS | OXYGEN SATURATION: 97 % | HEIGHT: 62 IN | SYSTOLIC BLOOD PRESSURE: 108 MMHG | DIASTOLIC BLOOD PRESSURE: 76 MMHG | HEART RATE: 84 BPM

## 2024-09-09 DIAGNOSIS — M25.552 PAIN IN LEFT HIP: ICD-10-CM

## 2024-09-09 DIAGNOSIS — M25.551 PAIN IN RIGHT HIP: ICD-10-CM

## 2024-09-09 PROCEDURE — 73523 X-RAY EXAM HIPS BI 5/> VIEWS: CPT

## 2024-09-09 PROCEDURE — 99203 OFFICE O/P NEW LOW 30 MIN: CPT

## 2024-09-09 RX ORDER — MELOXICAM 15 MG/1
15 TABLET ORAL
Qty: 30 | Refills: 3 | Status: ACTIVE | COMMUNITY
Start: 2024-09-09 | End: 1900-01-01

## 2024-09-09 NOTE — PHYSICAL EXAM
[de-identified] : Physical exam shows a healthy appearing of stated age interactive female no acute distress localizing pain to the region along the side of her hip not just over the trochanter but also applying the pelvic rim.  She is relatively diffusely tender throughout this area on the left side to the extent that we actually pause the exam as I was causing her discomfort.  The right side is much less tender.  She denies significant groin pain.  She denies trauma and injury. [de-identified] : AP pelvis and 2 views of each hip are performed.  There is slight grade 1-2 narrowing of the bilateral femoral heads however femoral heads remain spherical  Joint spaces well-maintained the AP radiograph of the pelvis there is some SI joint osteoarthritis.  There is some generalized diffuse osteoporosis.  Distally along the femurs cortical bone is relatively well-maintained.

## 2024-09-09 NOTE — ASSESSMENT
[FreeTextEntry1] : Impression bilateral trochanteric bursitis left much greater than right suggest anti-inflammatory.  She has a potential history of some issues associated with taking sulfa drugs in the past that are substantial we will try meloxicam instead of Celebrex today.  I did put in a prescription for some physical therapy as an outpatient for some visits as well as some home exercises.  She will contact us for any additional questions or problems and hopefully will do well and not require any additional follow-up.  Please excuse any errors as this was completed with Dragon software.

## 2024-09-09 NOTE — HISTORY OF PRESENT ILLNESS
[de-identified] : Ms. Valerio comes in today for the evaluation of bilateral hips.  She has more pain on the left than on the right.  Symptoms have been present for a couple of months.  She has had this before.  She has been told is bursitis before.  A close friend of hers thought it was bursitis again this time. She also admits to having some back discomfort in the past currently is not having any.

## 2024-09-11 ENCOUNTER — APPOINTMENT (OUTPATIENT)
Dept: ORTHOPEDIC SURGERY | Facility: CLINIC | Age: 59
End: 2024-09-11

## 2024-09-11 DIAGNOSIS — M70.61 TROCHANTERIC BURSITIS, RIGHT HIP: ICD-10-CM

## 2024-09-11 DIAGNOSIS — M70.62 TROCHANTERIC BURSITIS, RIGHT HIP: ICD-10-CM

## 2024-09-11 PROCEDURE — 99442: CPT | Mod: 93

## 2024-09-20 ENCOUNTER — APPOINTMENT (OUTPATIENT)
Dept: BREAST CENTER | Facility: CLINIC | Age: 59
End: 2024-09-20
Payer: MEDICAID

## 2024-09-20 VITALS — OXYGEN SATURATION: 95 % | BODY MASS INDEX: 29.44 KG/M2 | HEART RATE: 81 BPM | WEIGHT: 160 LBS | HEIGHT: 62 IN

## 2024-09-20 DIAGNOSIS — C43.39 MALIGNANT MELANOMA OF OTHER PARTS OF FACE: ICD-10-CM

## 2024-09-20 PROCEDURE — 99212 OFFICE O/P EST SF 10 MIN: CPT

## 2024-09-20 NOTE — PHYSICAL EXAM
[No Supraclavicular Adenopathy] : no supraclavicular adenopathy [No Cervical Adenopathy] : no cervical adenopathy [Clear to Auscultation Bilat] : clear to auscultation bilaterally [de-identified] : Scars healed well without signs of recurrence or satellite lesions. [de-identified] : No parotid masses.

## 2024-09-20 NOTE — HISTORY OF PRESENT ILLNESS
[FreeTextEntry1] : 8/23 wide excision left cheek melanoma. Residual melanoma to a depth of 1.3 mm with melanoma in situ at margins involving 7-10 o'clock [id number], Stage 1b  1023 left cheek reexcision, attempted/failed sentinel node biopsy.  (No migration with lymphoscintigraphy) Pathology results of the reexcision specimen shows no evidence of melanoma or melanoma in situ.  Patient denies any skin changes or nodules.  Patient did not have a CT scan of the head and neck and does not like the medical oncologist here.  Patient has a pulmonologist she is following up on some lung nodules and she has had a mesenteric mass that been evaluated found to be benign.  57-year-old woman has had a left cheek pigmented lesion for the last 10 years which has increased in size for the last 2 years.  Patient underwent a shave biopsy which revealed a 0.3 mm Umair level III melanoma without ulceration with a low mitotic index, pT1a.  Patient has not had any melanomas or other skin cancers and has no family history of melanoma although she has a family history of colon and breast cancer.  Patient has no other symptoms.

## 2024-09-25 ENCOUNTER — RESULT REVIEW (OUTPATIENT)
Age: 59
End: 2024-09-25

## 2024-10-01 ENCOUNTER — RX RENEWAL (OUTPATIENT)
Age: 59
End: 2024-10-01

## 2024-10-03 ENCOUNTER — RX RENEWAL (OUTPATIENT)
Age: 59
End: 2024-10-03

## 2024-10-17 ENCOUNTER — RX RENEWAL (OUTPATIENT)
Age: 59
End: 2024-10-17

## 2024-10-30 RX ORDER — LEVOTHYROXINE SODIUM 88 UG/1
88 TABLET ORAL
Qty: 90 | Refills: 0 | Status: ACTIVE | COMMUNITY
Start: 2024-10-30 | End: 1900-01-01

## 2024-11-05 ENCOUNTER — APPOINTMENT (OUTPATIENT)
Dept: PLASTIC SURGERY | Facility: CLINIC | Age: 59
End: 2024-11-05
Payer: MEDICAID

## 2024-11-05 PROCEDURE — 99213 OFFICE O/P EST LOW 20 MIN: CPT

## 2024-11-07 ENCOUNTER — LABORATORY RESULT (OUTPATIENT)
Age: 59
End: 2024-11-07

## 2024-11-07 ENCOUNTER — APPOINTMENT (OUTPATIENT)
Dept: FAMILY MEDICINE | Facility: CLINIC | Age: 59
End: 2024-11-07
Payer: MEDICAID

## 2024-11-07 VITALS
TEMPERATURE: 98.5 F | SYSTOLIC BLOOD PRESSURE: 120 MMHG | WEIGHT: 160 LBS | HEIGHT: 62 IN | BODY MASS INDEX: 29.44 KG/M2 | DIASTOLIC BLOOD PRESSURE: 60 MMHG | HEART RATE: 82 BPM | OXYGEN SATURATION: 95 %

## 2024-11-07 DIAGNOSIS — E55.9 VITAMIN D DEFICIENCY, UNSPECIFIED: ICD-10-CM

## 2024-11-07 DIAGNOSIS — K65.4 SCLEROSING MESENTERITIS: ICD-10-CM

## 2024-11-07 DIAGNOSIS — R73.03 PREDIABETES.: ICD-10-CM

## 2024-11-07 DIAGNOSIS — M54.12 RADICULOPATHY, CERVICAL REGION: ICD-10-CM

## 2024-11-07 DIAGNOSIS — E78.5 HYPERLIPIDEMIA, UNSPECIFIED: ICD-10-CM

## 2024-11-07 DIAGNOSIS — M70.62 TROCHANTERIC BURSITIS, RIGHT HIP: ICD-10-CM

## 2024-11-07 DIAGNOSIS — E53.8 DEFICIENCY OF OTHER SPECIFIED B GROUP VITAMINS: ICD-10-CM

## 2024-11-07 DIAGNOSIS — C43.39 MALIGNANT MELANOMA OF OTHER PARTS OF FACE: ICD-10-CM

## 2024-11-07 DIAGNOSIS — E03.9 HYPOTHYROIDISM, UNSPECIFIED: ICD-10-CM

## 2024-11-07 DIAGNOSIS — R91.1 SOLITARY PULMONARY NODULE: ICD-10-CM

## 2024-11-07 DIAGNOSIS — K80.20 CALCULUS OF GALLBLADDER W/OUT CHOLECYSTITIS W/OUT OBSTRUCTION: ICD-10-CM

## 2024-11-07 DIAGNOSIS — M70.61 TROCHANTERIC BURSITIS, RIGHT HIP: ICD-10-CM

## 2024-11-07 DIAGNOSIS — Z00.00 ENCOUNTER FOR GENERAL ADULT MEDICAL EXAMINATION W/OUT ABNORMAL FINDINGS: ICD-10-CM

## 2024-11-07 PROCEDURE — 99396 PREV VISIT EST AGE 40-64: CPT

## 2024-11-12 LAB
25(OH)D3 SERPL-MCNC: 39.2 NG/ML
ALBUMIN SERPL ELPH-MCNC: 4.4 G/DL
ALP BLD-CCNC: 86 U/L
ALT SERPL-CCNC: 15 U/L
ANION GAP SERPL CALC-SCNC: 16 MMOL/L
APPEARANCE: CLEAR
AST SERPL-CCNC: 21 U/L
BASOPHILS # BLD AUTO: 0.12 K/UL
BASOPHILS NFR BLD AUTO: 1.1 %
BILIRUB SERPL-MCNC: 0.3 MG/DL
BILIRUBIN URINE: NEGATIVE
BLOOD URINE: NEGATIVE
BUN SERPL-MCNC: 31 MG/DL
CALCIUM SERPL-MCNC: 10.8 MG/DL
CHLORIDE SERPL-SCNC: 98 MMOL/L
CHOLEST SERPL-MCNC: 233 MG/DL
CO2 SERPL-SCNC: 23 MMOL/L
COLOR: YELLOW
CREAT SERPL-MCNC: 1.04 MG/DL
EGFR: 62 ML/MIN/1.73M2
EOSINOPHIL # BLD AUTO: 0.86 K/UL
EOSINOPHIL NFR BLD AUTO: 7.7 %
ESTIMATED AVERAGE GLUCOSE: 126 MG/DL
FOLATE SERPL-MCNC: 5.6 NG/ML
GLUCOSE QUALITATIVE U: NEGATIVE MG/DL
GLUCOSE SERPL-MCNC: 94 MG/DL
HBA1C MFR BLD HPLC: 6 %
HCT VFR BLD CALC: 38.1 %
HDLC SERPL-MCNC: 58 MG/DL
HGB BLD-MCNC: 12.2 G/DL
IMM GRANULOCYTES NFR BLD AUTO: 0.5 %
KETONES URINE: ABNORMAL MG/DL
LDLC SERPL CALC-MCNC: 156 MG/DL
LEUKOCYTE ESTERASE URINE: ABNORMAL
LYMPHOCYTES # BLD AUTO: 2.44 K/UL
LYMPHOCYTES NFR BLD AUTO: 21.7 %
MAN DIFF?: NORMAL
MCHC RBC-ENTMCNC: 29.3 PG
MCHC RBC-ENTMCNC: 32 G/DL
MCV RBC AUTO: 91.4 FL
MONOCYTES # BLD AUTO: 1.19 K/UL
MONOCYTES NFR BLD AUTO: 10.6 %
NEUTROPHILS # BLD AUTO: 6.56 K/UL
NEUTROPHILS NFR BLD AUTO: 58.4 %
NITRITE URINE: NEGATIVE
NONHDLC SERPL-MCNC: 174 MG/DL
PH URINE: 5.5
PLATELET # BLD AUTO: 367 K/UL
POTASSIUM SERPL-SCNC: 5.1 MMOL/L
PROT SERPL-MCNC: 8.5 G/DL
PROTEIN URINE: 30 MG/DL
RBC # BLD: 4.17 M/UL
RBC # FLD: 14.7 %
SODIUM SERPL-SCNC: 137 MMOL/L
SPECIFIC GRAVITY URINE: 1.02
TRIGL SERPL-MCNC: 105 MG/DL
TSH SERPL-ACNC: 2.21 UIU/ML
UROBILINOGEN URINE: 0.2 MG/DL
VIT B12 SERPL-MCNC: 498 PG/ML
WBC # FLD AUTO: 11.23 K/UL

## 2024-11-18 ENCOUNTER — APPOINTMENT (OUTPATIENT)
Dept: INTERNAL MEDICINE | Facility: CLINIC | Age: 59
End: 2024-11-18
Payer: MEDICAID

## 2024-11-18 DIAGNOSIS — D72.829 ELEVATED WHITE BLOOD CELL COUNT, UNSPECIFIED: ICD-10-CM

## 2024-11-18 PROCEDURE — 36415 COLL VENOUS BLD VENIPUNCTURE: CPT

## 2024-11-19 DIAGNOSIS — N39.0 URINARY TRACT INFECTION, SITE NOT SPECIFIED: ICD-10-CM

## 2024-11-19 LAB
ALBUMIN SERPL ELPH-MCNC: 4.5 G/DL
ALP BLD-CCNC: 87 U/L
ALT SERPL-CCNC: 13 U/L
ANION GAP SERPL CALC-SCNC: 16 MMOL/L
APPEARANCE: ABNORMAL
AST SERPL-CCNC: 19 U/L
BACTERIA: ABNORMAL /HPF
BASOPHILS # BLD AUTO: 0.12 K/UL
BASOPHILS NFR BLD AUTO: 1.1 %
BILIRUB SERPL-MCNC: 0.4 MG/DL
BILIRUBIN URINE: NEGATIVE
BLOOD URINE: NEGATIVE
BUN SERPL-MCNC: 24 MG/DL
CALCIUM SERPL-MCNC: 9.8 MG/DL
CAST: 4 /LPF
CHLORIDE SERPL-SCNC: 98 MMOL/L
CO2 SERPL-SCNC: 23 MMOL/L
COLOR: NORMAL
CREAT SERPL-MCNC: 0.95 MG/DL
EGFR: 68 ML/MIN/1.73M2
EOSINOPHIL # BLD AUTO: 0.89 K/UL
EOSINOPHIL NFR BLD AUTO: 8.4 %
EPITHELIAL CELLS: 7 /HPF
GLUCOSE QUALITATIVE U: NEGATIVE
GLUCOSE SERPL-MCNC: 99 MG/DL
HCT VFR BLD CALC: 38.3 %
HGB BLD-MCNC: 12.3 G/DL
IMM GRANULOCYTES NFR BLD AUTO: 0.5 %
KETONES URINE: NEGATIVE
LEUKOCYTE ESTERASE URINE: ABNORMAL
LYMPHOCYTES # BLD AUTO: 2.44 K/UL
LYMPHOCYTES NFR BLD AUTO: 23.1 %
MAN DIFF?: NORMAL
MCHC RBC-ENTMCNC: 29 PG
MCHC RBC-ENTMCNC: 32.1 G/DL
MCV RBC AUTO: 90.3 FL
MICROSCOPIC-UA: NORMAL
MONOCYTES # BLD AUTO: 0.97 K/UL
MONOCYTES NFR BLD AUTO: 9.2 %
NEUTROPHILS # BLD AUTO: 6.09 K/UL
NEUTROPHILS NFR BLD AUTO: 57.7 %
NITRITE URINE: POSITIVE
PH URINE: 6
PLATELET # BLD AUTO: 329 K/UL
POTASSIUM SERPL-SCNC: 4.3 MMOL/L
PROT SERPL-MCNC: 8.5 G/DL
PROTEIN URINE: NEGATIVE
RBC # BLD: 4.24 M/UL
RBC # FLD: 14.5 %
RED BLOOD CELLS URINE: 10 /HPF
REVIEW: NORMAL
SODIUM SERPL-SCNC: 138 MMOL/L
SPECIFIC GRAVITY URINE: 1.02
UROBILINOGEN URINE: NORMAL
WBC # FLD AUTO: 10.56 K/UL
WHITE BLOOD CELLS URINE: 41 /HPF
YEAST-LIKE CELLS: PRESENT

## 2024-11-19 RX ORDER — NITROFURANTOIN (MONOHYDRATE/MACROCRYSTALS) 25; 75 MG/1; MG/1
100 CAPSULE ORAL
Qty: 10 | Refills: 0 | Status: ACTIVE | COMMUNITY
Start: 2024-11-19 | End: 1900-01-01

## 2024-11-21 LAB — BACTERIA UR CULT: NORMAL

## 2024-12-17 ENCOUNTER — APPOINTMENT (OUTPATIENT)
Dept: PLASTIC SURGERY | Facility: CLINIC | Age: 59
End: 2024-12-17
Payer: MEDICAID

## 2024-12-17 PROCEDURE — 14040 TIS TRNFR F/C/C/M/N/A/G/H/F: CPT

## 2024-12-23 ENCOUNTER — APPOINTMENT (OUTPATIENT)
Dept: PLASTIC SURGERY | Facility: CLINIC | Age: 59
End: 2024-12-23
Payer: MEDICAID

## 2024-12-23 VITALS
OXYGEN SATURATION: 98 % | WEIGHT: 160 LBS | SYSTOLIC BLOOD PRESSURE: 127 MMHG | HEIGHT: 62 IN | BODY MASS INDEX: 29.44 KG/M2 | HEART RATE: 84 BPM | RESPIRATION RATE: 16 BRPM | DIASTOLIC BLOOD PRESSURE: 66 MMHG

## 2024-12-23 PROCEDURE — 99024 POSTOP FOLLOW-UP VISIT: CPT

## 2024-12-27 ENCOUNTER — APPOINTMENT (OUTPATIENT)
Dept: FAMILY MEDICINE | Facility: CLINIC | Age: 59
End: 2024-12-27
Payer: MEDICAID

## 2024-12-27 VITALS
HEIGHT: 62 IN | OXYGEN SATURATION: 100 % | HEART RATE: 71 BPM | WEIGHT: 160 LBS | SYSTOLIC BLOOD PRESSURE: 130 MMHG | BODY MASS INDEX: 29.44 KG/M2 | TEMPERATURE: 98.4 F | DIASTOLIC BLOOD PRESSURE: 62 MMHG

## 2024-12-27 DIAGNOSIS — R35.0 FREQUENCY OF MICTURITION: ICD-10-CM

## 2024-12-27 PROCEDURE — G2211 COMPLEX E/M VISIT ADD ON: CPT | Mod: NC

## 2024-12-27 PROCEDURE — 99214 OFFICE O/P EST MOD 30 MIN: CPT

## 2024-12-27 RX ORDER — CIPROFLOXACIN HYDROCHLORIDE 250 MG/1
250 TABLET, FILM COATED ORAL
Qty: 6 | Refills: 0 | Status: ACTIVE | COMMUNITY
Start: 2024-12-27 | End: 1900-01-01

## 2024-12-28 PROBLEM — R35.0 INCREASED FREQUENCY OF URINATION: Status: ACTIVE | Noted: 2019-04-19

## 2025-01-07 RX ORDER — CIPROFLOXACIN HYDROCHLORIDE 500 MG/1
500 TABLET, FILM COATED ORAL
Qty: 14 | Refills: 0 | Status: ACTIVE | COMMUNITY
Start: 2025-01-07 | End: 1900-01-01

## 2025-01-21 ENCOUNTER — NON-APPOINTMENT (OUTPATIENT)
Age: 60
End: 2025-01-21

## 2025-02-07 ENCOUNTER — LABORATORY RESULT (OUTPATIENT)
Age: 60
End: 2025-02-07

## 2025-02-07 ENCOUNTER — RESULT REVIEW (OUTPATIENT)
Age: 60
End: 2025-02-07

## 2025-02-12 ENCOUNTER — APPOINTMENT (OUTPATIENT)
Dept: ENDOCRINOLOGY | Facility: CLINIC | Age: 60
End: 2025-02-12

## 2025-02-20 ENCOUNTER — APPOINTMENT (OUTPATIENT)
Dept: OBGYN | Facility: CLINIC | Age: 60
End: 2025-02-20
Payer: MEDICAID

## 2025-02-20 VITALS
WEIGHT: 160 LBS | DIASTOLIC BLOOD PRESSURE: 70 MMHG | SYSTOLIC BLOOD PRESSURE: 142 MMHG | BODY MASS INDEX: 29.44 KG/M2 | HEIGHT: 62 IN

## 2025-02-20 DIAGNOSIS — Z01.419 ENCOUNTER FOR GYNECOLOGICAL EXAMINATION (GENERAL) (ROUTINE) W/OUT ABNORMAL FINDINGS: ICD-10-CM

## 2025-02-20 DIAGNOSIS — Z11.51 ENCOUNTER FOR SCREENING FOR HUMAN PAPILLOMAVIRUS (HPV): ICD-10-CM

## 2025-02-20 DIAGNOSIS — Z12.4 ENCOUNTER FOR SCREENING FOR MALIGNANT NEOPLASM OF CERVIX: ICD-10-CM

## 2025-02-20 PROCEDURE — 99396 PREV VISIT EST AGE 40-64: CPT

## 2025-02-21 LAB — HPV HIGH+LOW RISK DNA PNL CVX: NOT DETECTED

## 2025-02-24 ENCOUNTER — APPOINTMENT (OUTPATIENT)
Dept: ENDOCRINOLOGY | Facility: CLINIC | Age: 60
End: 2025-02-24
Payer: MEDICAID

## 2025-02-24 VITALS
BODY MASS INDEX: 29.44 KG/M2 | HEART RATE: 71 BPM | OXYGEN SATURATION: 95 % | SYSTOLIC BLOOD PRESSURE: 132 MMHG | DIASTOLIC BLOOD PRESSURE: 64 MMHG | WEIGHT: 160 LBS | HEIGHT: 62 IN

## 2025-02-24 DIAGNOSIS — E04.1 NONTOXIC SINGLE THYROID NODULE: ICD-10-CM

## 2025-02-24 DIAGNOSIS — M81.0 AGE-RELATED OSTEOPOROSIS W/OUT CURRENT PATHOLOGICAL FRACTURE: ICD-10-CM

## 2025-02-24 DIAGNOSIS — E78.5 HYPERLIPIDEMIA, UNSPECIFIED: ICD-10-CM

## 2025-02-24 DIAGNOSIS — E03.9 HYPOTHYROIDISM, UNSPECIFIED: ICD-10-CM

## 2025-02-24 DIAGNOSIS — R73.03 PREDIABETES.: ICD-10-CM

## 2025-02-24 DIAGNOSIS — E55.9 VITAMIN D DEFICIENCY, UNSPECIFIED: ICD-10-CM

## 2025-02-24 PROCEDURE — G2211 COMPLEX E/M VISIT ADD ON: CPT | Mod: NC

## 2025-02-24 PROCEDURE — 99215 OFFICE O/P EST HI 40 MIN: CPT

## 2025-02-24 RX ORDER — ATORVASTATIN CALCIUM 10 MG/1
10 TABLET, FILM COATED ORAL
Qty: 90 | Refills: 3 | Status: ACTIVE | COMMUNITY
Start: 2025-02-24 | End: 1900-01-01

## 2025-02-24 RX ORDER — LEVOTHYROXINE SODIUM 88 UG/1
88 TABLET ORAL
Refills: 0 | Status: ACTIVE | COMMUNITY
Start: 2025-02-24

## 2025-02-26 ENCOUNTER — TRANSCRIPTION ENCOUNTER (OUTPATIENT)
Age: 60
End: 2025-02-26

## 2025-02-26 LAB — CYTOLOGY CVX/VAG DOC THIN PREP: ABNORMAL

## 2025-02-27 ENCOUNTER — RESULT REVIEW (OUTPATIENT)
Age: 60
End: 2025-02-27

## 2025-03-03 ENCOUNTER — RX RENEWAL (OUTPATIENT)
Age: 60
End: 2025-03-03

## 2025-03-07 ENCOUNTER — RX RENEWAL (OUTPATIENT)
Age: 60
End: 2025-03-07

## 2025-03-21 ENCOUNTER — APPOINTMENT (OUTPATIENT)
Dept: BREAST CENTER | Facility: CLINIC | Age: 60
End: 2025-03-21
Payer: MEDICAID

## 2025-03-21 VITALS
SYSTOLIC BLOOD PRESSURE: 157 MMHG | HEART RATE: 72 BPM | OXYGEN SATURATION: 98 % | HEIGHT: 62 IN | WEIGHT: 160 LBS | BODY MASS INDEX: 29.44 KG/M2 | DIASTOLIC BLOOD PRESSURE: 79 MMHG

## 2025-03-21 DIAGNOSIS — C43.39 MALIGNANT MELANOMA OF OTHER PARTS OF FACE: ICD-10-CM

## 2025-03-21 PROCEDURE — 99213 OFFICE O/P EST LOW 20 MIN: CPT

## 2025-05-20 ENCOUNTER — APPOINTMENT (OUTPATIENT)
Dept: FAMILY MEDICINE | Facility: CLINIC | Age: 60
End: 2025-05-20
Payer: MEDICAID

## 2025-05-20 ENCOUNTER — LABORATORY RESULT (OUTPATIENT)
Age: 60
End: 2025-05-20

## 2025-05-20 VITALS
WEIGHT: 162 LBS | DIASTOLIC BLOOD PRESSURE: 70 MMHG | SYSTOLIC BLOOD PRESSURE: 120 MMHG | HEIGHT: 62 IN | TEMPERATURE: 96.9 F | BODY MASS INDEX: 29.81 KG/M2 | HEART RATE: 74 BPM | OXYGEN SATURATION: 97 %

## 2025-05-20 DIAGNOSIS — M25.50 PAIN IN UNSPECIFIED JOINT: ICD-10-CM

## 2025-05-20 DIAGNOSIS — R20.0 ANESTHESIA OF SKIN: ICD-10-CM

## 2025-05-20 DIAGNOSIS — G56.03 CARPAL TUNNEL SYNDROM,BILATERAL UPPER LIMBS: ICD-10-CM

## 2025-05-20 DIAGNOSIS — R20.2 ANESTHESIA OF SKIN: ICD-10-CM

## 2025-05-20 PROCEDURE — 99214 OFFICE O/P EST MOD 30 MIN: CPT

## 2025-05-20 PROCEDURE — G2211 COMPLEX E/M VISIT ADD ON: CPT | Mod: NC

## 2025-05-25 LAB
25(OH)D3 SERPL-MCNC: 41.5 NG/ML
ALBUMIN SERPL ELPH-MCNC: 4.3 G/DL
ALP BLD-CCNC: 129 U/L
ALT SERPL-CCNC: 19 U/L
ANA PAT FLD IF-IMP: ABNORMAL
ANA PATTERN: ABNORMAL
ANA SER IF-ACNC: ABNORMAL
ANA TITER: ABNORMAL
ANION GAP SERPL CALC-SCNC: 18 MMOL/L
AST SERPL-CCNC: 29 U/L
BASOPHILS # BLD AUTO: 0.15 K/UL
BASOPHILS NFR BLD AUTO: 1.4 %
BILIRUB SERPL-MCNC: 0.3 MG/DL
BUN SERPL-MCNC: 22 MG/DL
CALCIUM SERPL-MCNC: 8.9 MG/DL
CCP AB SER IA-ACNC: <8 U/ML
CHLORIDE SERPL-SCNC: 101 MMOL/L
CO2 SERPL-SCNC: 20 MMOL/L
CREAT SERPL-MCNC: 0.9 MG/DL
CRP SERPL-MCNC: 16 MG/L
DSDNA AB SER-ACNC: <1 IU/ML
EGFRCR SERPLBLD CKD-EPI 2021: 73 ML/MIN/1.73M2
EOSINOPHIL # BLD AUTO: 1.45 K/UL
EOSINOPHIL NFR BLD AUTO: 13.3 %
ERYTHROCYTE [SEDIMENTATION RATE] IN BLOOD BY WESTERGREN METHOD: 120 MM/HR
ESTIMATED AVERAGE GLUCOSE: 126 MG/DL
FERRITIN SERPL-MCNC: 219 NG/ML
FOLATE SERPL-MCNC: 5.4 NG/ML
GLUCOSE SERPL-MCNC: 100 MG/DL
HBA1C MFR BLD HPLC: 6 %
HCT VFR BLD CALC: 38.7 %
HGB BLD-MCNC: 11.8 G/DL
IMM GRANULOCYTES NFR BLD AUTO: 0.5 %
IRON SATN MFR SERPL: 22 %
IRON SERPL-MCNC: 68 UG/DL
LYMPHOCYTES # BLD AUTO: 2.05 K/UL
LYMPHOCYTES NFR BLD AUTO: 18.9 %
MAGNESIUM SERPL-MCNC: 1.9 MG/DL
MAN DIFF?: NORMAL
MCHC RBC-ENTMCNC: 29.7 PG
MCHC RBC-ENTMCNC: 30.5 G/DL
MCV RBC AUTO: 97.5 FL
MONOCYTES # BLD AUTO: 0.71 K/UL
MONOCYTES NFR BLD AUTO: 6.5 %
NEUTROPHILS # BLD AUTO: 6.46 K/UL
NEUTROPHILS NFR BLD AUTO: 59.4 %
PLATELET # BLD AUTO: 251 K/UL
POTASSIUM SERPL-SCNC: 3.8 MMOL/L
PROT SERPL-MCNC: 8.4 G/DL
RBC # BLD: 3.97 M/UL
RBC # FLD: 15.1 %
RF+CCP IGG SER-IMP: NEGATIVE
RHEUMATOID FACT SER QL: <10 IU/ML
SODIUM SERPL-SCNC: 139 MMOL/L
TIBC SERPL-MCNC: 311 UG/DL
TSH SERPL-ACNC: 5.53 UIU/ML
UIBC SERPL-MCNC: 243 UG/DL
VIT B12 SERPL-MCNC: 601 PG/ML
WBC # FLD AUTO: 10.87 K/UL

## 2025-05-28 ENCOUNTER — LABORATORY RESULT (OUTPATIENT)
Age: 60
End: 2025-05-28

## 2025-05-28 ENCOUNTER — APPOINTMENT (OUTPATIENT)
Dept: INTERNAL MEDICINE | Facility: CLINIC | Age: 60
End: 2025-05-28
Payer: MEDICAID

## 2025-05-28 DIAGNOSIS — E03.9 HYPOTHYROIDISM, UNSPECIFIED: ICD-10-CM

## 2025-05-28 PROCEDURE — 36415 COLL VENOUS BLD VENIPUNCTURE: CPT

## 2025-06-02 ENCOUNTER — APPOINTMENT (OUTPATIENT)
Dept: ORTHOPEDIC SURGERY | Facility: CLINIC | Age: 60
End: 2025-06-02
Payer: MEDICAID

## 2025-06-02 VITALS
BODY MASS INDEX: 29.44 KG/M2 | HEIGHT: 62 IN | OXYGEN SATURATION: 98 % | DIASTOLIC BLOOD PRESSURE: 73 MMHG | HEART RATE: 80 BPM | SYSTOLIC BLOOD PRESSURE: 110 MMHG | WEIGHT: 160 LBS

## 2025-06-02 DIAGNOSIS — G56.03 CARPAL TUNNEL SYNDROM,BILATERAL UPPER LIMBS: ICD-10-CM

## 2025-06-02 PROCEDURE — 99204 OFFICE O/P NEW MOD 45 MIN: CPT

## 2025-06-05 RX ORDER — GABAPENTIN 100 MG/1
100 CAPSULE ORAL
Qty: 90 | Refills: 0 | Status: ACTIVE | COMMUNITY
Start: 2025-06-05 | End: 1900-01-01

## 2025-07-07 ENCOUNTER — RX RENEWAL (OUTPATIENT)
Age: 60
End: 2025-07-07

## 2025-07-10 ENCOUNTER — NON-APPOINTMENT (OUTPATIENT)
Age: 60
End: 2025-07-10

## 2025-08-05 ENCOUNTER — NON-APPOINTMENT (OUTPATIENT)
Age: 60
End: 2025-08-05

## 2025-08-25 ENCOUNTER — RX RENEWAL (OUTPATIENT)
Age: 60
End: 2025-08-25

## 2025-08-27 ENCOUNTER — RESULT REVIEW (OUTPATIENT)
Age: 60
End: 2025-08-27

## 2025-09-02 ENCOUNTER — APPOINTMENT (OUTPATIENT)
Dept: ENDOCRINOLOGY | Facility: CLINIC | Age: 60
End: 2025-09-02
Payer: MEDICAID

## 2025-09-02 VITALS
HEART RATE: 82 BPM | OXYGEN SATURATION: 94 % | DIASTOLIC BLOOD PRESSURE: 64 MMHG | WEIGHT: 155 LBS | BODY MASS INDEX: 27.46 KG/M2 | HEIGHT: 63 IN | SYSTOLIC BLOOD PRESSURE: 132 MMHG

## 2025-09-02 DIAGNOSIS — R73.03 PREDIABETES.: ICD-10-CM

## 2025-09-02 DIAGNOSIS — E03.9 HYPOTHYROIDISM, UNSPECIFIED: ICD-10-CM

## 2025-09-02 DIAGNOSIS — E78.5 HYPERLIPIDEMIA, UNSPECIFIED: ICD-10-CM

## 2025-09-02 DIAGNOSIS — M81.0 AGE-RELATED OSTEOPOROSIS W/OUT CURRENT PATHOLOGICAL FRACTURE: ICD-10-CM

## 2025-09-02 DIAGNOSIS — E04.1 NONTOXIC SINGLE THYROID NODULE: ICD-10-CM

## 2025-09-02 DIAGNOSIS — E55.9 VITAMIN D DEFICIENCY, UNSPECIFIED: ICD-10-CM

## 2025-09-02 PROCEDURE — 99215 OFFICE O/P EST HI 40 MIN: CPT

## 2025-09-02 PROCEDURE — G2211 COMPLEX E/M VISIT ADD ON: CPT | Mod: NC

## 2025-09-02 RX ORDER — LEVOTHYROXINE SODIUM 88 UG/1
88 TABLET ORAL
Qty: 90 | Refills: 1 | Status: ACTIVE | COMMUNITY
Start: 2025-09-02 | End: 1900-01-01